# Patient Record
Sex: FEMALE | Race: WHITE | Employment: FULL TIME | URBAN - METROPOLITAN AREA
[De-identification: names, ages, dates, MRNs, and addresses within clinical notes are randomized per-mention and may not be internally consistent; named-entity substitution may affect disease eponyms.]

---

## 2023-07-14 LAB
CHLAMYDIA TRACH., AMPLIFIED: NEGATIVE
CLUE CELLS: PRESENT
N. GONORRHEA, AMPLIFIED: NEGATIVE
NUGENT SCORE: 7
TRICHOMONAS VAGINALIS: NEGATIVE
YEAST: ABNORMAL

## 2024-01-02 ENCOUNTER — TELEPHONE (OUTPATIENT)
Dept: PRIMARY CARE | Facility: CLINIC | Age: 29
End: 2024-01-02

## 2024-01-04 DIAGNOSIS — F32.A DEPRESSION, UNSPECIFIED DEPRESSION TYPE: Primary | ICD-10-CM

## 2024-01-04 RX ORDER — BUPROPION HYDROCHLORIDE 200 MG/1
200 TABLET, EXTENDED RELEASE ORAL 2 TIMES DAILY
COMMUNITY
Start: 2023-06-06 | End: 2024-01-04 | Stop reason: SDUPTHER

## 2024-01-04 RX ORDER — BUPROPION HYDROCHLORIDE 150 MG/1
150 TABLET, EXTENDED RELEASE ORAL EVERY 12 HOURS
COMMUNITY

## 2024-01-04 RX ORDER — FLUCONAZOLE 150 MG/1
TABLET ORAL
COMMUNITY

## 2024-01-04 RX ORDER — METRONIDAZOLE 500 MG/1
TABLET ORAL
COMMUNITY

## 2024-01-05 RX ORDER — BUPROPION HYDROCHLORIDE 200 MG/1
200 TABLET, EXTENDED RELEASE ORAL 2 TIMES DAILY
Qty: 180 TABLET | Refills: 0 | Status: SHIPPED | OUTPATIENT
Start: 2024-01-05

## 2024-01-05 RX ORDER — BUPROPION HYDROCHLORIDE 150 MG/1
150 TABLET, EXTENDED RELEASE ORAL EVERY 12 HOURS
OUTPATIENT
Start: 2024-01-05

## 2024-06-07 ENCOUNTER — APPOINTMENT (OUTPATIENT)
Dept: OBSTETRICS AND GYNECOLOGY | Facility: CLINIC | Age: 29
End: 2024-06-07
Payer: MEDICARE

## 2024-06-13 ENCOUNTER — LAB (OUTPATIENT)
Dept: LAB | Facility: LAB | Age: 29
End: 2024-06-13
Payer: MEDICARE

## 2024-06-13 ENCOUNTER — APPOINTMENT (OUTPATIENT)
Dept: OBSTETRICS AND GYNECOLOGY | Facility: CLINIC | Age: 29
End: 2024-06-13
Payer: MEDICARE

## 2024-06-13 DIAGNOSIS — N89.8 VAGINAL DISCHARGE: ICD-10-CM

## 2024-06-13 DIAGNOSIS — O09.91 SUPERVISION OF HIGH RISK PREGNANCY IN FIRST TRIMESTER (HHS-HCC): Primary | ICD-10-CM

## 2024-06-13 DIAGNOSIS — I37.0 PULMONARY VALVE STENOSIS, UNSPECIFIED ETIOLOGY: ICD-10-CM

## 2024-06-13 DIAGNOSIS — Z87.59 HISTORY OF IUFD: ICD-10-CM

## 2024-06-13 DIAGNOSIS — F32.A DEPRESSION AFFECTING PREGNANCY IN FIRST TRIMESTER, ANTEPARTUM (HHS-HCC): ICD-10-CM

## 2024-06-13 DIAGNOSIS — O09.91 SUPERVISION OF HIGH RISK PREGNANCY IN FIRST TRIMESTER (HHS-HCC): ICD-10-CM

## 2024-06-13 DIAGNOSIS — F32.A DEPRESSION, UNSPECIFIED DEPRESSION TYPE: ICD-10-CM

## 2024-06-13 DIAGNOSIS — Z67.91 RH NEGATIVE STATE IN ANTEPARTUM PERIOD (HHS-HCC): ICD-10-CM

## 2024-06-13 DIAGNOSIS — O26.899 RH NEGATIVE STATE IN ANTEPARTUM PERIOD (HHS-HCC): ICD-10-CM

## 2024-06-13 DIAGNOSIS — O99.341 DEPRESSION AFFECTING PREGNANCY IN FIRST TRIMESTER, ANTEPARTUM (HHS-HCC): ICD-10-CM

## 2024-06-13 DIAGNOSIS — G43.909 MIGRAINE WITHOUT STATUS MIGRAINOSUS, NOT INTRACTABLE, UNSPECIFIED MIGRAINE TYPE: ICD-10-CM

## 2024-06-13 DIAGNOSIS — Z12.4 SCREENING FOR CERVICAL CANCER: ICD-10-CM

## 2024-06-13 LAB
ABO GROUP (TYPE) IN BLOOD: NORMAL
ANTIBODY SCREEN: NORMAL
ERYTHROCYTE [DISTWIDTH] IN BLOOD BY AUTOMATED COUNT: 12.1 % (ref 11.5–14.5)
HBV SURFACE AG SERPL QL IA: NONREACTIVE
HCT VFR BLD AUTO: 39.9 % (ref 36–46)
HCV AB SER QL: NONREACTIVE
HGB BLD-MCNC: 13.6 G/DL (ref 12–16)
HIV 1+2 AB+HIV1 P24 AG SERPL QL IA: NONREACTIVE
MCH RBC QN AUTO: 31.1 PG (ref 26–34)
MCHC RBC AUTO-ENTMCNC: 34.1 G/DL (ref 32–36)
MCV RBC AUTO: 91 FL (ref 80–100)
NRBC BLD-RTO: 0 /100 WBCS (ref 0–0)
PLATELET # BLD AUTO: 238 X10*3/UL (ref 150–450)
RBC # BLD AUTO: 4.38 X10*6/UL (ref 4–5.2)
REFLEX ADDED, ANEMIA PANEL: NORMAL
RH FACTOR (ANTIGEN D): NORMAL
RUBV IGG SERPL IA-ACNC: 1.4 IA
RUBV IGG SERPL QL IA: POSITIVE
TREPONEMA PALLIDUM IGG+IGM AB [PRESENCE] IN SERUM OR PLASMA BY IMMUNOASSAY: NONREACTIVE
WBC # BLD AUTO: 9.5 X10*3/UL (ref 4.4–11.3)

## 2024-06-13 PROCEDURE — 87086 URINE CULTURE/COLONY COUNT: CPT

## 2024-06-13 PROCEDURE — 36415 COLL VENOUS BLD VENIPUNCTURE: CPT

## 2024-06-13 PROCEDURE — 0500F INITIAL PRENATAL CARE VISIT: CPT | Performed by: OBSTETRICS & GYNECOLOGY

## 2024-06-13 PROCEDURE — 87205 SMEAR GRAM STAIN: CPT

## 2024-06-13 PROCEDURE — 87389 HIV-1 AG W/HIV-1&-2 AB AG IA: CPT

## 2024-06-13 PROCEDURE — 88175 CYTOPATH C/V AUTO FLUID REDO: CPT

## 2024-06-13 PROCEDURE — 86803 HEPATITIS C AB TEST: CPT

## 2024-06-13 PROCEDURE — 86900 BLOOD TYPING SEROLOGIC ABO: CPT

## 2024-06-13 PROCEDURE — 87661 TRICHOMONAS VAGINALIS AMPLIF: CPT

## 2024-06-13 PROCEDURE — 86850 RBC ANTIBODY SCREEN: CPT

## 2024-06-13 PROCEDURE — 86901 BLOOD TYPING SEROLOGIC RH(D): CPT

## 2024-06-13 PROCEDURE — 86780 TREPONEMA PALLIDUM: CPT

## 2024-06-13 PROCEDURE — 87624 HPV HI-RISK TYP POOLED RSLT: CPT

## 2024-06-13 PROCEDURE — 87340 HEPATITIS B SURFACE AG IA: CPT

## 2024-06-13 PROCEDURE — 83021 HEMOGLOBIN CHROMOTOGRAPHY: CPT

## 2024-06-13 PROCEDURE — 87491 CHLMYD TRACH DNA AMP PROBE: CPT

## 2024-06-13 PROCEDURE — 87591 N.GONORRHOEAE DNA AMP PROB: CPT

## 2024-06-13 PROCEDURE — 86317 IMMUNOASSAY INFECTIOUS AGENT: CPT

## 2024-06-13 PROCEDURE — 85027 COMPLETE CBC AUTOMATED: CPT

## 2024-06-13 RX ORDER — BUPROPION HYDROCHLORIDE 200 MG/1
200 TABLET, EXTENDED RELEASE ORAL 2 TIMES DAILY
Qty: 180 TABLET | Refills: 3 | Status: SHIPPED | OUTPATIENT
Start: 2024-06-13
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
## 2024-06-14 PROBLEM — G43.909 MIGRAINE WITHOUT STATUS MIGRAINOSUS, NOT INTRACTABLE: Status: ACTIVE | Noted: 2024-06-14

## 2024-06-14 PROBLEM — O26.899 RH NEGATIVE STATE IN ANTEPARTUM PERIOD (HHS-HCC): Status: ACTIVE | Noted: 2024-06-14

## 2024-06-14 PROBLEM — Z87.59 HISTORY OF IUFD: Status: ACTIVE | Noted: 2024-06-14

## 2024-06-14 PROBLEM — F32.A DEPRESSION AFFECTING PREGNANCY IN FIRST TRIMESTER, ANTEPARTUM (HHS-HCC): Status: ACTIVE | Noted: 2024-06-14

## 2024-06-14 PROBLEM — I37.0 PULMONARY VALVE STENOSIS: Status: ACTIVE | Noted: 2024-06-14

## 2024-06-14 PROBLEM — Z67.91 RH NEGATIVE STATE IN ANTEPARTUM PERIOD (HHS-HCC): Status: ACTIVE | Noted: 2024-06-14

## 2024-06-14 PROBLEM — O99.341 DEPRESSION AFFECTING PREGNANCY IN FIRST TRIMESTER, ANTEPARTUM (HHS-HCC): Status: ACTIVE | Noted: 2024-06-14

## 2024-06-14 PROBLEM — O09.91 SUPERVISION OF HIGH RISK PREGNANCY IN FIRST TRIMESTER (HHS-HCC): Status: ACTIVE | Noted: 2024-06-14

## 2024-06-14 LAB
BACTERIA UR CULT: NO GROWTH
BACTERIAL VAGINOSIS VAG-IMP: NORMAL
CLUE CELLS VAG LPF-#/AREA: NORMAL /[LPF]
NUGENT SCORE: 6
YEAST VAG WET PREP-#/AREA: NORMAL

## 2024-06-15 DIAGNOSIS — B96.89 BV (BACTERIAL VAGINOSIS): Primary | ICD-10-CM

## 2024-06-15 DIAGNOSIS — N76.0 BV (BACTERIAL VAGINOSIS): Primary | ICD-10-CM

## 2024-06-15 LAB
C TRACH RRNA SPEC QL NAA+PROBE: NEGATIVE
N GONORRHOEA DNA SPEC QL PROBE+SIG AMP: NEGATIVE
T VAGINALIS RRNA SPEC QL NAA+PROBE: NEGATIVE

## 2024-06-15 RX ORDER — METRONIDAZOLE 7.5 MG/G
GEL VAGINAL DAILY
Qty: 70 G | Refills: 0 | Status: SHIPPED | OUTPATIENT
Start: 2024-06-15 | End: 2024-06-20

## 2024-06-17 ENCOUNTER — HOSPITAL ENCOUNTER (OUTPATIENT)
Dept: RADIOLOGY | Facility: CLINIC | Age: 29
Discharge: HOME | End: 2024-06-17
Payer: MEDICARE

## 2024-06-17 DIAGNOSIS — O09.91 SUPERVISION OF HIGH RISK PREGNANCY IN FIRST TRIMESTER (HHS-HCC): ICD-10-CM

## 2024-06-17 LAB
HEMOGLOBIN A2: 2.9 % (ref 2–3.5)
HEMOGLOBIN A: 96.7 % (ref 95.8–98)
HEMOGLOBIN F: 0.4 % (ref 0–2)
HEMOGLOBIN IDENTIFICATION INTERPRETATION: NORMAL
PATH REVIEW-HGB IDENTIFICATION: NORMAL

## 2024-06-17 PROCEDURE — 76801 OB US < 14 WKS SINGLE FETUS: CPT | Performed by: MEDICAL GENETICS

## 2024-06-17 PROCEDURE — 76801 OB US < 14 WKS SINGLE FETUS: CPT

## 2024-06-28 ENCOUNTER — TELEPHONE (OUTPATIENT)
Dept: OBSTETRICS AND GYNECOLOGY | Facility: CLINIC | Age: 29
End: 2024-06-28
Payer: MEDICARE

## 2024-06-28 LAB
CYTOLOGY CMNT CVX/VAG CYTO-IMP: NORMAL
HPV HR GENOTYPES PNL CVX NAA+PROBE: POSITIVE
LAB AP HPV GENOTYPE QUESTION: NO
LAB AP HPV HR: NORMAL
LAB AP PAP ADDITIONAL TESTS: NORMAL
LABORATORY COMMENT REPORT: NORMAL
LMP START DATE: NORMAL
PATH REPORT.TOTAL CANCER: NORMAL

## 2024-06-28 NOTE — TELEPHONE ENCOUNTER
Spoke with patient about results. Patient was sent over information about HPV and answer all questions. Patient was forward to  to schedule prenatal visit and colpo would be completed at her visit.

## 2024-07-01 ENCOUNTER — INITIAL PRENATAL (OUTPATIENT)
Dept: MATERNAL FETAL MEDICINE | Facility: HOSPITAL | Age: 29
End: 2024-07-01
Payer: MEDICARE

## 2024-07-01 VITALS — SYSTOLIC BLOOD PRESSURE: 123 MMHG | WEIGHT: 182 LBS | DIASTOLIC BLOOD PRESSURE: 75 MMHG | BODY MASS INDEX: 30.29 KG/M2

## 2024-07-01 DIAGNOSIS — Z87.59 HISTORY OF IUFD: ICD-10-CM

## 2024-07-01 DIAGNOSIS — Z3A.12 12 WEEKS GESTATION OF PREGNANCY (HHS-HCC): Primary | ICD-10-CM

## 2024-07-01 DIAGNOSIS — I37.0 PULMONARY VALVE STENOSIS, UNSPECIFIED ETIOLOGY: ICD-10-CM

## 2024-07-01 PROCEDURE — 99205 OFFICE O/P NEW HI 60 MIN: CPT | Performed by: STUDENT IN AN ORGANIZED HEALTH CARE EDUCATION/TRAINING PROGRAM

## 2024-07-01 PROCEDURE — 99215 OFFICE O/P EST HI 40 MIN: CPT | Performed by: STUDENT IN AN ORGANIZED HEALTH CARE EDUCATION/TRAINING PROGRAM

## 2024-07-01 NOTE — PROGRESS NOTES
Zoraida Martinez is a 27 yo  @ 12w2d with a history of prior IUFD and pulmonary stenosis who presents for a [facility] consultation.     In Zoraida's last pregnancy she was diagnosed with an IUFD at 23 weeks gestation. She initially presented to clinic where they were unable to obtain fetal heart tones. She was sent to the ED where a limited ultrasound diagnosed an intrauterine fetal demise measuring approximately 17 weeks 1 day.    The following day she presented to L&D for an induction of labor. She had a negative KB and normal TSH (2.41) on admission. She proceeded to a vaginal delivery with  ml. Placental pathology showed an immature placenta measuring less than the 10%, peripheral insertion of the umbilical cord, recent retroplacental hematoma with intraplacental extension and surrounding villous infarction and involutional changes of an intrauterine demise. Genetics revealed a normal microarray. An autopsy was not performed.    Following delivery she was transferred to Psychiatry due to severe depression. She was hospitalized for six days and ultimately discharged on lamotrogine, sertraline, quetiapine and clonazepam. She currently only takes Wellbutrin.    Zoraida also reports a history of pulmonary stenosis. She states this was diagnosed when she born. She denies chest pain or shortness of breath. She has not followed with Cardiology.    Zoraida is otherwise doing well. She reports some cramping but denies vaginal bleeding. No other concerns.    PMHX: depression, anxiety, pulmonary stenosis, ADHD  PSHX: denies  OBHX:    1st: EAB, D&C   2nd: IUFD @ 23 weeks   3rd: current  GYN: denies STI  MEDS: PNV, Wellbutrin  ALL: NKDA  SOCIAL: denies tobacco/alcohol/illicit drug use  FAMILY: mother - HTN, prediabetic    O: /75   Wt 82.6 kg (182 lb)   LMP 2024   BMI 30.29 kg/m²   Gen: NAD  Resp: nonlabored breathing  Cardiac: good peripheral perfusion  Abd: gravid  Psych: appropriate mood and  affect  FHTs: 163    A/P: Zoraida Martinez is a 29 yo  @ 12w2d with a history of prior IUFD and pulmonary stenosis who presents for a [facility] consultation.     History of a prior stillbirth:    Women with a prior history of stillbirth or pregnancy complications, such as fetal growth restriction, or preeclampsia, are at an increased risk of stillbirth in subsequent pregnancies.    The most important tests in the evaluation of a stillbirth are fetal autopsy; examination of the placenta, cord, and membranes; and karyotype/microarray evaluation. Zoraida had a normal microarray and the placental pathology was unremarkable. An autopsy was not performed. Maternal evaluation should include a lupus anticoagulant, anticardiolipin IgG and IgM, anti-?2-glycoprotein I IgG and IgM. I would recommend that these are ordered at her next visit.    Unfortunately, significant proportion of stillbirths remain unexplained even after a thorough evaluation. Due to Zoraida's history of an IUFD at 23 weeks that was measuring approximately 17 weeks I would recommend the following:    -Early anatomy at 16-17 weeks gestation  -Serial growth ultrasound at 24 weeks gestation  - testing at 32 weeks (can consider as early as 24 weeks)  -Delivery can be considered at 37 weeks for maternal anxiety (the risk of  admission was discussed with patient)    Pulmonary stenosis:    Zoraida reports a history of a pulmonary stenosis that was diagnosed as an infant. She was previously followed by Cardiology but states she hasn't been seen since childhood. Zoraida is currently NYHA class I with a mWHO risk class I (2-5% maternal cardiac event rate). We reviewed that pregnancy is a dynamic cardiac state which can unmask or worsen pre-existing cardiac dysfunction and that women with cardiac disease face a number of challenges due to the changes in maternal physiology. To accommodate the developing fetus, cardiac output increases by up to 45% through  an increase in maternal heart rate (20%) and plasma volume (up to 50%). In a patient with cardiomyopathy or a structurally abnormal heart, these changes can predispose to arrhythmias and heart failure. Fortunately, pregnancy is typically well-tolerated by patients with pulmonary stenosis.     An echocardiogram was previously ordered. I have also taken the liberty of ordering a BNP. If the echocardiogram is abnormal I would recommend a referral to Cardiology. I am hopeful that with close monitoring she will have a favorable outcome with a spontaneous vaginal delivery. Telemetry and SBE prophylaxis is not indicated.  section should be reserved for routine obstetric indications. We discussed that children of mothers with congenital cardiac anomalies are at increased risk of having a structural defect themselves. We recommend all women with this history have a fetal echo.     Thank you for the consultation. Please reach out to me if you have any questions or concerns.    Indra Banegas MD  Maternal-Fetal Medicine

## 2024-07-02 ENCOUNTER — HOSPITAL ENCOUNTER (OUTPATIENT)
Dept: RADIOLOGY | Facility: CLINIC | Age: 29
Discharge: HOME | End: 2024-07-02
Payer: MEDICARE

## 2024-07-02 ENCOUNTER — APPOINTMENT (OUTPATIENT)
Dept: LAB | Facility: LAB | Age: 29
End: 2024-07-02
Payer: MEDICARE

## 2024-07-02 DIAGNOSIS — O09.91 SUPERVISION OF HIGH RISK PREGNANCY IN FIRST TRIMESTER (HHS-HCC): ICD-10-CM

## 2024-07-02 DIAGNOSIS — Z34.91 ENCOUNTER FOR SUPERVISION OF NORMAL PREGNANCY, UNSPECIFIED, FIRST TRIMESTER (HHS-HCC): Primary | ICD-10-CM

## 2024-07-02 DIAGNOSIS — O26.891 OTHER SPECIFIED PREGNANCY RELATED CONDITIONS, FIRST TRIMESTER (HHS-HCC): ICD-10-CM

## 2024-07-02 DIAGNOSIS — O99.211 OBESITY COMPLICATING PREGNANCY, FIRST TRIMESTER (HHS-HCC): ICD-10-CM

## 2024-07-02 PROCEDURE — 76816 OB US FOLLOW-UP PER FETUS: CPT | Performed by: STUDENT IN AN ORGANIZED HEALTH CARE EDUCATION/TRAINING PROGRAM

## 2024-07-02 PROCEDURE — 83520 IMMUNOASSAY QUANT NOS NONAB: CPT

## 2024-07-02 PROCEDURE — 84702 CHORIONIC GONADOTROPIN TEST: CPT

## 2024-07-02 PROCEDURE — 76816 OB US FOLLOW-UP PER FETUS: CPT

## 2024-07-02 PROCEDURE — 36415 COLL VENOUS BLD VENIPUNCTURE: CPT

## 2024-07-02 PROCEDURE — 86336 INHIBIN A: CPT

## 2024-07-02 PROCEDURE — 76813 OB US NUCHAL MEAS 1 GEST: CPT | Performed by: STUDENT IN AN ORGANIZED HEALTH CARE EDUCATION/TRAINING PROGRAM

## 2024-07-02 PROCEDURE — 76813 OB US NUCHAL MEAS 1 GEST: CPT

## 2024-07-08 LAB
# FETUSES US: 1
1ST TRIMESTER SCN+NT PATIENT-IMP: NORMAL
AGE AT DELIVERY: 27.5 YR
FET CRL US.MEAS: 66.7 MM
FET NUCHAL FOLD MOM THICKNESS US.MEAS: 0.93
FET NUCHAL FOLD THICKNESS US.MEAS: 1.4 MM
FET TS 18 RISK FROM MAT AGE: NORMAL
FET TS 21 RISK FROM MAT AGE: NORMAL
GA: 12.7 WEEKS
HCG ADJ MOM SERPL: 1
HCG SERPL-ACNC: 74.5 IU/ML
INHIBIN A ADJ MOM SERPL: 1.31
INHIBIN A SERPL-MCNC: 261.2 PG/ML
LABORATORY COMMENT REPORT: NORMAL
NOTE,FIRST TRIMESTER SCREEN W/NT: NORMAL
PAPP-A ADJ MOM SERPL: 0.83
PAPP-A SERPL-MCNC: 694.7 NG/ML
RESULTS,FIRST TRIMESTER SCREEN W/NT: NORMAL
SONOGRAPHER: NORMAL
TS 18 RISK FETUS: NORMAL
TS 21 RISK FETUS: NORMAL
US DATE: NORMAL

## 2024-07-15 ENCOUNTER — APPOINTMENT (OUTPATIENT)
Dept: OBSTETRICS AND GYNECOLOGY | Facility: CLINIC | Age: 29
End: 2024-07-15
Payer: MEDICARE

## 2024-07-15 VITALS — WEIGHT: 184 LBS | SYSTOLIC BLOOD PRESSURE: 116 MMHG | BODY MASS INDEX: 30.62 KG/M2 | DIASTOLIC BLOOD PRESSURE: 68 MMHG

## 2024-07-15 DIAGNOSIS — I37.0 PULMONARY VALVE STENOSIS, UNSPECIFIED ETIOLOGY: ICD-10-CM

## 2024-07-15 DIAGNOSIS — Z87.59 HISTORY OF IUFD: ICD-10-CM

## 2024-07-15 DIAGNOSIS — O26.899 RH NEGATIVE STATE IN ANTEPARTUM PERIOD (HHS-HCC): ICD-10-CM

## 2024-07-15 DIAGNOSIS — R87.810 ASCUS WITH POSITIVE HIGH RISK HPV CERVICAL: ICD-10-CM

## 2024-07-15 DIAGNOSIS — Z67.91 RH NEGATIVE STATE IN ANTEPARTUM PERIOD (HHS-HCC): ICD-10-CM

## 2024-07-15 DIAGNOSIS — O09.91 SUPERVISION OF HIGH RISK PREGNANCY IN FIRST TRIMESTER (HHS-HCC): Primary | ICD-10-CM

## 2024-07-15 DIAGNOSIS — O99.341 DEPRESSION AFFECTING PREGNANCY IN FIRST TRIMESTER, ANTEPARTUM (HHS-HCC): ICD-10-CM

## 2024-07-15 DIAGNOSIS — R87.610 ASCUS WITH POSITIVE HIGH RISK HPV CERVICAL: ICD-10-CM

## 2024-07-15 DIAGNOSIS — F32.A DEPRESSION AFFECTING PREGNANCY IN FIRST TRIMESTER, ANTEPARTUM (HHS-HCC): ICD-10-CM

## 2024-07-15 PROCEDURE — 0501F PRENATAL FLOW SHEET: CPT | Performed by: OBSTETRICS & GYNECOLOGY

## 2024-07-16 NOTE — PROGRESS NOTES
OB Follow up visit    ASSESSMENT & PLAN    Zoraida Martinez is a 28 y.o.  at 17w5d presenting for routine prenatal care    Problem List Items Addressed This Visit       Supervision of high risk pregnancy in first trimester ([facility]) - Primary    Overview     [x] Dating: by 10 week ultrasound (LMP unknown)  [] Initial BMI: need height to calculate  [x] Prenatal Labs: reviewed, Rh negative, Rubella immune, Hgb 13.6  [x] Pap: 2024 ASCUS/+HR HPV, colpo performed and no high grade abnormalities suspected  [x] Aneuploidy Screening: normal first trimester screen  [x] Baby ASA: not indicated, one moderate risk factor  [] Anatomy US: scheduled  [] 1hr GCT at 24-28wks:  [] Tdap (27-36wks):  [] Flu Shot:  [] COVID vaccine:   [] Rhogam (if Rh neg):  [] Third trimester growth:   [] GBS at 36 wks:  [] Breastfeeding  [] PPBC:   [] 39 weeks discussion of IOL vs. Expectant management:  [] Mode of delivery:            Depression affecting pregnancy in first trimester, antepartum ([facility])    Overview     - previously diagnosed with anxiety, depression, ADHD, PTSD, as well as bipolar affective disorder but patient questions this diagnosis  - Cut down wellbyutin dose from 200mg to 100mg BID but is feeling more symptomatic and desires to return to 200mg BID  - recommend establishing care with psychiatry and she is agreeable. Scheduled for first visit with psychiatry in August         History of IUFD    Overview     - 23 weeks fetal demise, delivered by   - No clear etiology based on chart review, normal microarray and placental pathology, no autopsy performed  - ordered lupus anticoagulant, anticardiolipin antibodies and anti-B2 glycoprotein  - plan for serial growth ultrasound after 24 weeks   - plan for  testing at 32 weeks (can consider as early as 24 weeks)  - plan for delivery no later than 39 weeks. Can consider at 37 weeks for maternal anxiety          Relevant Orders    Lupus Anticoagulant With Interpretation  [TUCKER]    Anti-Cardiolipin Antibody (IgA, IgG, and IgM)    Beta-2 Glycoprotein Antibodies    Rh negative state in antepartum period ([facility])    Pulmonary valve stenosis    Overview     S/p MFM Consult  Echo scheduled, if abnormal plan for cardiology consult  BNP order in place  Plan for fetal echo  No need for telemetry in labor or SBE prophylaxis               Orders Placed This Encounter   Procedures    Lupus Anticoagulant With Interpretation [TUCKER]     Standing Status:   Future     Standing Expiration Date:   7/15/2025     Order Specific Question:   Release result to [place]     Answer:   Immediate [1]    Anti-Cardiolipin Antibody (IgA, IgG, and IgM)     Standing Status:   Future     Standing Expiration Date:   7/15/2025     Order Specific Question:   Release result to MyChart     Answer:   Immediate [1]    Beta-2 Glycoprotein Antibodies     Standing Status:   Future     Standing Expiration Date:   7/15/2025     Order Specific Question:   Release result to MyChart     Answer:   Immediate [1]      RTC in 4 weeks      SUBJECTIVE    HPI: Zoraida Martinez is a 28 y.o.  at 17w5d here for RPNV. Patient reports mood has been down. Has her appointment with psychiatry coming up in August. No acute symptoms. Has support from her cousins. Father of the baby is not supportive of the pregnancy at this time.       OBJECTIVE    Visit Vitals  /68   Wt 83.5 kg (184 lb)   LMP  (LMP Unknown)   BMI 30.62 kg/m²   OB Status Pregnant   Smoking Status Never   BSA 1.96 m²        Ashlee Cyr MD

## 2024-07-16 NOTE — PROGRESS NOTES
Patient ID: Zoraida Martinez is a 28 y.o. female.    Colposcopy    Date/Time: 7/16/2024 8:58 AM    Performed by: Ashlee Cyr MD  Authorized by: Ashlee Cyr MD    Consent:     Patient questions answered: yes      Risks and benefits of the procedure and its alternatives discussed: yes      Consent obtained:  Written    Consent given by:  Patient  Pre-procedure:     Prep solution(s): acetic acid    Procedure:     Colposcopy with: colposcopy only      Colposcopy details:  Nabothian cyst at 9 o clock  No lesions noted    Cervix visibility: fully visualized      SCJ visibility: fully visualized    Post-procedure:     Patient tolerance of procedure:  Patient tolerated the procedure well with no immediate complications

## 2024-07-22 ENCOUNTER — APPOINTMENT (OUTPATIENT)
Dept: CARDIOLOGY | Facility: CLINIC | Age: 29
End: 2024-07-22
Payer: MEDICARE

## 2024-08-12 ENCOUNTER — APPOINTMENT (OUTPATIENT)
Dept: OBSTETRICS AND GYNECOLOGY | Facility: CLINIC | Age: 29
End: 2024-08-12
Payer: MEDICARE

## 2024-08-12 VITALS — SYSTOLIC BLOOD PRESSURE: 104 MMHG | WEIGHT: 190 LBS | BODY MASS INDEX: 31.62 KG/M2 | DIASTOLIC BLOOD PRESSURE: 60 MMHG

## 2024-08-12 DIAGNOSIS — O26.899 RH NEGATIVE STATE IN ANTEPARTUM PERIOD (HHS-HCC): ICD-10-CM

## 2024-08-12 DIAGNOSIS — O99.341 DEPRESSION AFFECTING PREGNANCY IN FIRST TRIMESTER, ANTEPARTUM (HHS-HCC): ICD-10-CM

## 2024-08-12 DIAGNOSIS — Z67.91 RH NEGATIVE STATE IN ANTEPARTUM PERIOD (HHS-HCC): ICD-10-CM

## 2024-08-12 DIAGNOSIS — G43.809 OTHER MIGRAINE WITHOUT STATUS MIGRAINOSUS, NOT INTRACTABLE: ICD-10-CM

## 2024-08-12 DIAGNOSIS — F32.A DEPRESSION AFFECTING PREGNANCY IN FIRST TRIMESTER, ANTEPARTUM (HHS-HCC): ICD-10-CM

## 2024-08-12 DIAGNOSIS — Z86.79 HISTORY OF PULMONARY ARTERY STENOSIS: ICD-10-CM

## 2024-08-12 DIAGNOSIS — Z87.59 HISTORY OF IUFD: ICD-10-CM

## 2024-08-12 DIAGNOSIS — I37.0 PULMONARY VALVE STENOSIS, UNSPECIFIED ETIOLOGY: ICD-10-CM

## 2024-08-12 DIAGNOSIS — O09.91 SUPERVISION OF HIGH RISK PREGNANCY IN FIRST TRIMESTER (HHS-HCC): Primary | ICD-10-CM

## 2024-08-12 PROCEDURE — 0501F PRENATAL FLOW SHEET: CPT | Performed by: OBSTETRICS & GYNECOLOGY

## 2024-08-12 NOTE — PROGRESS NOTES
OB Follow up visit    ASSESSMENT & PLAN    Zoraida Martinez is a 29 y.o.  at 18w2d presenting for routine prenatal care    Problem List Items Addressed This Visit       Supervision of high risk pregnancy in first trimester ([facility]) - Primary    Overview     [x] Dating: by 10 week ultrasound (LMP unknown)  [] Initial BMI: need height to calculate  [x] Prenatal Labs: reviewed, Rh negative, Rubella immune, Hgb 13.6  [x] Pap: 2024 ASCUS/+HR HPV, colpo performed and no high grade abnormalities suspected  [x] Aneuploidy Screening: normal first trimester screen  [x] Baby ASA: not indicated, one moderate risk factor  [] Anatomy US: scheduled  [] 1hr GCT at 24-28wks:  [] Tdap (27-36wks):  [] Flu Shot:  [] COVID vaccine:   [] Rhogam (if Rh neg):  [] Third trimester growth:   [] GBS at 36 wks:  [] Breastfeeding  [] PPBC:   [] 39 weeks discussion of IOL vs. Expectant management:  [] Mode of delivery:            Depression affecting pregnancy in first trimester, antepartum ([facility])    Overview     - previously diagnosed with anxiety, depression, ADHD, PTSD, as well as bipolar affective disorder but patient questions this diagnosis  - on wellbutin 200mg BID   - scheduled with Dr. Jenkins in a few days         History of IUFD    Overview     - 23 weeks fetal demise, delivered by   - No clear etiology based on chart review, normal microarray and placental pathology, no autopsy performed  - ordered lupus anticoagulant, anticardiolipin antibodies and anti-B2 glycoprotein  - plan for serial growth ultrasound after 24 weeks   - plan for  testing at 32 weeks (can consider as early as 24 weeks)  - plan for delivery no later than 39 weeks. Can consider at 37 weeks for maternal anxiety          Rh negative state in antepartum period ([facility])    Pulmonary valve stenosis    Overview     S/p MFM Consult  insurance denied coverage of echo so it was cancelled. Will re-order and do peer to peer. Patient unable to get any  records from childhood in [place]. Reports that she was followed for this through high school.  BNP order in place  Plan for fetal echo  No need for telemetry in labor or SBE prophylaxis           Migraine without status migrainosus, not intractable    Overview     Reviewed safe medications in pregnancy          Other Visit Diagnoses       History of pulmonary artery stenosis        Relevant Orders    Transthoracic Echo (TTE) Complete             Orders Placed This Encounter   Procedures    Transthoracic Echo (TTE) Complete     Standing Status:   Future     Standing Expiration Date:   2025     Order Specific Question:   Reason for exam:     Answer:   history of pulmonary stenosis     Order Specific Question:   Possible 3D echo?     Answer:   No     Order Specific Question:   Possible strain echo?     Answer:   No     Order Specific Question:   Where is your preferred location to perform this study?     Answer:   First Available        RTC in 4 weeks      SUBJECTIVE    HPI: Zoraida Martinez is a 29 y.o.  at 18w2d here for RPNV. Denies contractions, bleeding, or LOF. No fetal movement. Patient reports no new concerns today       OBJECTIVE    Visit Vitals  /60   Wt 86.2 kg (190 lb)   LMP  (LMP Unknown)   BMI 31.62 kg/m²   OB Status Pregnant   Smoking Status Never   BSA 1.99 m²        Ashlee Cyr MD

## 2024-08-15 ENCOUNTER — APPOINTMENT (OUTPATIENT)
Dept: BEHAVIORAL HEALTH | Facility: CLINIC | Age: 29
End: 2024-08-15
Payer: MEDICARE

## 2024-08-15 DIAGNOSIS — F32.A DEPRESSION AFFECTING PREGNANCY IN FIRST TRIMESTER, ANTEPARTUM (HHS-HCC): ICD-10-CM

## 2024-08-15 DIAGNOSIS — F41.9 ANXIETY: ICD-10-CM

## 2024-08-15 DIAGNOSIS — F32.A DEPRESSION, UNSPECIFIED DEPRESSION TYPE: ICD-10-CM

## 2024-08-15 DIAGNOSIS — F90.0 ATTENTION DEFICIT HYPERACTIVITY DISORDER (ADHD), PREDOMINANTLY INATTENTIVE TYPE: ICD-10-CM

## 2024-08-15 DIAGNOSIS — O99.341 DEPRESSION AFFECTING PREGNANCY IN FIRST TRIMESTER, ANTEPARTUM (HHS-HCC): ICD-10-CM

## 2024-08-15 PROCEDURE — 99204 OFFICE O/P NEW MOD 45 MIN: CPT | Performed by: STUDENT IN AN ORGANIZED HEALTH CARE EDUCATION/TRAINING PROGRAM

## 2024-08-15 RX ORDER — CLONAZEPAM 0.5 MG/1
0.25 TABLET ORAL DAILY PRN
Qty: 15 TABLET | Refills: 2 | Status: SHIPPED | OUTPATIENT
Start: 2024-08-15 | End: 2024-11-13

## 2024-08-15 NOTE — PROGRESS NOTES
Subjective    All Individuals Present: Patient and Provider (Encounter Provider)     ID: Zoraida Martinez is a 29 y.o. female with hx ADHD, depression, anxiety presenting for antepartum consultation.     Interval History/HPI/PFSH:  @19wga, excited about pregnancy. Plans to try breastfeeding.    Currently on bupropion 200 mg BID, usually helps with depression. Has also been used to manage her ADHD, never been on stimulants. Would always be late since grade school.    Partner left her, is angry because they had always been close. He left her because she wouldn't get an . Now hasn't heard from him in a month.    Feels like being depressed or anxious may harm baby.    Dx ADHD at 21 yo after struggling in school for years    Has previously been on sertraline (no benefit), fluoxetine (more anxious).    IUFD happened on Mother's day, was transferred straight to inpatient psych.    Terrified of being too sedated for when baby comes.    Has anatomy scan next week.     Medication side effects: None Reported    Past Psychiatric Hx:  Dx: Depression, anxiety, ADHD  -Hospitalization: once,  immediately after IUFD, there for 6 days, discharged on lamotrigine, quetiapine  -No SA, some SIB by cutting as teenager, last time was right after IUFD  -Therapist: none currently  -Rx trials: sertraline (no benefit), fluoxetine (more anxious), lamotrigine (breakout rash on hands), quetiapine (helped for sleep after hospital), alprazolam (too sedating), was previously given clonazepam at 21 yo for ADHD ostensibly (clarified was clonazepam and not clonidine), clonazepam worked better than alprazolam because less sedating and longer lasting, was on for years, was stopped after inpatient hospitalization, hydroxyzine while in the hospital (very groggy/sedated)  -briefly saw [facility] after hospitalization    Patient Active Problem List   Diagnosis    Supervision of high risk pregnancy in first trimester ([facility])    Depression  affecting pregnancy in first trimester, antepartum ([facility])    History of IUFD    Rh negative state in antepartum period ([facility])    Pulmonary valve stenosis    Migraine without status migrainosus, not intractable      Current Outpatient Medications on File Prior to Visit   Medication Sig Dispense Refill    buPROPion SR (Wellbutrin SR) 200 mg 12 hr tablet Take 1 tablet (200 mg) by mouth 2 times a day. 180 tablet 3    prenatal vit,calc76-iron-folic (Prenatabs Rx) 29 mg iron- 1 mg tablet Take 1 tablet by mouth once daily.       No current facility-administered medications on file prior to visit.        No Known Allergies  seasonal    No past surgical history on file.  none    No family history on file.   Mom- depression, ADHD  Maternal grandmother- schizophrenia, dementia  Maternal great grandfather- schizophrenia      Social History     Socioeconomic History    Marital status: Single     Spouse name: Not on file    Number of children: Not on file    Years of education: Not on file    Highest education level: Not on file   Occupational History    Not on file   Tobacco Use    Smoking status: Never    Smokeless tobacco: Never   Substance and Sexual Activity    Alcohol use: Not on file    Drug use: Not on file    Sexual activity: Not on file   Other Topics Concern    Not on file   Social History Narrative    Not on file     Social Determinants of Health     Financial Resource Strain: Not on file   Food Insecurity: Not on file   Transportation Needs: Not on file   Physical Activity: Not on file   Stress: Not on file   Social Connections: Not on file   Intimate Partner Violence: Not on file      Currently working bartending  Partner just left her when pt wouldn't get an .  Cousin and close friend are two biggest support systems.  Plans to go back to school for sports medicine    Substances:  -no tobacco  -no EtOH (stopped before pregnancy)  -no cananbis, no illicits  -1-2 cans Red Bull per day    OB History     Para Term  AB Living   3 1   1 1     SAB IAB Ectopic Multiple Live Births   0 1            # Outcome Date GA Lbr Tim/2nd Weight Sex Type Anes PTL Lv   3 Current            2      M Vag-Spont   FD   1 IAB                  Review of Systems  Constitutional: Positive for fatigue, Negative for fevers  Psychiatric: Positive for anxiety, depression, fatigue, sleep disturbance, and inattention, Negative for decreased appetite and irritability  Neurological: Positive for headaches   Other: @19wga; pulmonary valve stenosis    Objective   LMP  (LMP Unknown)   Wt Readings from Last 4 Encounters:   24 86.2 kg (190 lb)   07/15/24 83.5 kg (184 lb)   24 82.6 kg (182 lb)   23 83.9 kg (185 lb)       Mental Status Exam  General Appearance: Well groomed, appropriate eye contact.  Attitude/Behavior: Cooperative, conversant, engaged, and with good eye contact.  Motor: No psychomotor agitation or retardation, no tremor or other abnormal movements.  Speech: Normal rate, volume, prosody  Gait/Station: Within normal limits  Mood: depressed, anxious.  Affect: Dysphoric, constricted but reactive, Anxious, and Congruent with mood and topic of conversation  Thought Process: Circumstantial  Thought Associations: No loosening of associations  Thought Content: normal  Sensorium: Alert and oriented to person, place, time and situation  Insight: Intact, as evidenced by awareness of symptom patterns  Judgment: Intact  Cognition: Attention: Mild impairment in attention, Fund of Knowledge: Good, Language: Naming intact, Orientation: x4, Recent memory: intact, and Remote Memory: intact  Testing:  refer to prior ADHD testing.    Laboratory/Imaging/Diagnostic Tests       Assessment/Plan   Overall Formulation and Differential Diagnosis:  Zoraida Martinez is a 29 y.o. female who meets criteria for depression, anxiety, ADHD.  Interval Assessment:  @19wga presenting with depression and anxiety during pregnancy. Hx  ADHD. Limited psychosocial support. Has had fair response from bupropion for depression/ADHD and will continue. Has had good effect from clonazepam before, will cautiously continue PRN while stabilizing mood/anxiety. Discussed risks, benefits, alternatives, and existing data on use of bupropion and clonazepam in pregnancy and lactation and she expressed understanding.    Plan:  -continue bupropion 200 mg BID  -clonazepam 0.25 mg PO daily PRN panic  -RTC 4 weeks    Risk Assessment:  Imminent Risk of Suicide or Serious Self-Injury: Low Risk -- Risk factors include: Depression, History of trauma or abuse , Lack of social supports , Loss (relational, social, occupational, financial) , Medical illness comorbidity , and Severe anxiety Protective factors include:Denies current suicidal ideation, Denies history of suicide attempts , Future-oriented talk , Willingness to seek help and support , Skills in problem solving, conflict resolution, and nonviolent handling of disputes, Cultural and Rastafarian beliefs that discourage suicide and support self-preservation , Access to a variety of clinical interventions , Receiving and engaged in care for mental, physical, and substance use disorders , History of adhering to treatment recommendations and/or prescribed medication regimen , Support through ongoing medical and mental healthcare relationships , Current/history of good response to treatment/meds , and Restricted access to firearms or other lethal means of suicide   Imminent Risk of Violence or Homicide: Low Risk - Risk factors include: No significant risk factors identified on screening. Protective factors include: Lack of known history of harm to others , Lack of known history of violent ideation , and Lack of known access to firearms   Treatment Plan:  There are no recently modified care plans to display for this patient.      Attestation Statements   Number of Minutes Spent Performing Evaluation & Management (E&M): 60

## 2024-08-19 ENCOUNTER — HOSPITAL ENCOUNTER (OUTPATIENT)
Dept: RADIOLOGY | Facility: CLINIC | Age: 29
End: 2024-08-19
Payer: MEDICARE

## 2024-08-27 ENCOUNTER — DOCUMENTATION (OUTPATIENT)
Dept: OBSTETRICS AND GYNECOLOGY | Facility: HOSPITAL | Age: 29
End: 2024-08-27

## 2024-08-27 ENCOUNTER — HOSPITAL ENCOUNTER (OUTPATIENT)
Dept: RADIOLOGY | Facility: CLINIC | Age: 29
Discharge: HOME | End: 2024-08-27
Payer: MEDICARE

## 2024-08-27 ENCOUNTER — LAB (OUTPATIENT)
Dept: LAB | Facility: LAB | Age: 29
End: 2024-08-27
Payer: MEDICARE

## 2024-08-27 DIAGNOSIS — I37.0 PULMONARY VALVE STENOSIS, UNSPECIFIED ETIOLOGY: ICD-10-CM

## 2024-08-27 DIAGNOSIS — F32.A DEPRESSION AFFECTING PREGNANCY IN FIRST TRIMESTER, ANTEPARTUM (HHS-HCC): ICD-10-CM

## 2024-08-27 DIAGNOSIS — O99.341 DEPRESSION AFFECTING PREGNANCY IN FIRST TRIMESTER, ANTEPARTUM (HHS-HCC): ICD-10-CM

## 2024-08-27 DIAGNOSIS — O09.91 SUPERVISION OF HIGH RISK PREGNANCY IN FIRST TRIMESTER (HHS-HCC): ICD-10-CM

## 2024-08-27 DIAGNOSIS — Z87.59 HISTORY OF IUFD: ICD-10-CM

## 2024-08-27 DIAGNOSIS — O26.899 RH NEGATIVE STATE IN ANTEPARTUM PERIOD (HHS-HCC): ICD-10-CM

## 2024-08-27 DIAGNOSIS — Z3A.12 12 WEEKS GESTATION OF PREGNANCY (HHS-HCC): ICD-10-CM

## 2024-08-27 DIAGNOSIS — Z67.91 RH NEGATIVE STATE IN ANTEPARTUM PERIOD (HHS-HCC): ICD-10-CM

## 2024-08-27 DIAGNOSIS — O09.91 SUPERVISION OF HIGH RISK PREGNANCY IN FIRST TRIMESTER (HHS-HCC): Primary | ICD-10-CM

## 2024-08-27 LAB — BNP SERPL-MCNC: 71 PG/ML (ref 0–99)

## 2024-08-27 PROCEDURE — 76811 OB US DETAILED SNGL FETUS: CPT | Performed by: OBSTETRICS & GYNECOLOGY

## 2024-08-27 PROCEDURE — 83880 ASSAY OF NATRIURETIC PEPTIDE: CPT

## 2024-08-27 PROCEDURE — 85613 RUSSELL VIPER VENOM DILUTED: CPT

## 2024-08-27 PROCEDURE — 76811 OB US DETAILED SNGL FETUS: CPT

## 2024-08-27 PROCEDURE — 86147 CARDIOLIPIN ANTIBODY EA IG: CPT

## 2024-08-27 PROCEDURE — 86146 BETA-2 GLYCOPROTEIN ANTIBODY: CPT

## 2024-08-27 PROCEDURE — 85730 THROMBOPLASTIN TIME PARTIAL: CPT

## 2024-08-27 PROCEDURE — 36415 COLL VENOUS BLD VENIPUNCTURE: CPT

## 2024-08-28 LAB
B2 GLYCOPROT1 IGA SER-ACNC: <0.6 U/ML
B2 GLYCOPROT1 IGG SER-ACNC: <1.4 U/ML
B2 GLYCOPROT1 IGM SER-ACNC: 0.6 U/ML
CARDIOLIPIN IGA SERPL-ACNC: <0.5 APL U/ML
CARDIOLIPIN IGG SER IA-ACNC: <1.6 GPL U/ML
CARDIOLIPIN IGM SER IA-ACNC: 0.3 MPL U/ML

## 2024-08-29 LAB
DRVVT SCREEN TO CONFIRM RATIO: 0.73 RATIO
DRVVT/DRVVT CFM NRMLZD PPP-RTO: 0.81 RATIO
DRVVT/DRVVT CFM P DOAC NEUT NORM PPP-RTO: 0.9 RATIO
LA 2 SCREEN W REFLEX-IMP: NORMAL
NORMALIZED SCT PPP-RTO: 0.88 RATIO
SILICA CLOTTING TIME CONFIRMATION: 0.87 RATIO
SILICA CLOTTING TIME SCREEN: 0.76 RATIO

## 2024-08-29 NOTE — PROGRESS NOTES
Late entry from 8/27:  This NPs assistance with obtaining approval for Maternal Echo was requested by Dr. Wright.  This NP called the [facility] ([phone]) and initiated the request for approval for her 8/12 echo order on 8/27.   The referral is now under review, Tracking number #[id number].   She will likely need a Peer 2 Peer.  I can also keep an eye on the status of the order and notify her primary OB if/when a P2P is requested.   If this Echo is not approved, per Dr. Banegas will plan to refer her to cardiology.     Krystyna Cannon CNP  Complex/High Risk OB   [phone]

## 2024-09-04 ENCOUNTER — TELEPHONE (OUTPATIENT)
Dept: OBSTETRICS AND GYNECOLOGY | Facility: HOSPITAL | Age: 29
End: 2024-09-04
Payer: MEDICARE

## 2024-09-04 ENCOUNTER — TELEPHONE (OUTPATIENT)
Dept: OBSTETRICS AND GYNECOLOGY | Facility: CLINIC | Age: 29
End: 2024-09-04
Payer: MEDICARE

## 2024-09-04 ENCOUNTER — TELEPHONE (OUTPATIENT)
Dept: OTHER | Age: 29
End: 2024-09-04
Payer: MEDICARE

## 2024-09-04 DIAGNOSIS — F41.9 ANXIETY: ICD-10-CM

## 2024-09-04 RX ORDER — CLONAZEPAM 0.5 MG/1
0.5 TABLET ORAL DAILY PRN
Qty: 15 TABLET | Refills: 1 | Status: SHIPPED | OUTPATIENT
Start: 2024-09-04 | End: 2024-09-05 | Stop reason: SDUPTHER

## 2024-09-04 NOTE — TELEPHONE ENCOUNTER
This NP called patient to assist with scheduling Echo.   No answer, left voicemail requesting a call back ([phone]).  This NP also, confirmed with Adult Echo manager that an appt must be scheduled to initiate the approval process.   This NP will continue to be available for coordination of care while awaiting call back from patient.      Krystyna Cannon CNP  Complex/High Risk OB   [phone]

## 2024-09-05 DIAGNOSIS — F41.9 ANXIETY: ICD-10-CM

## 2024-09-05 RX ORDER — CLONAZEPAM 0.5 MG/1
TABLET ORAL
Qty: 15 TABLET | Refills: 1 | Status: SHIPPED | OUTPATIENT
Start: 2024-09-05

## 2024-09-06 ENCOUNTER — TELEPHONE (OUTPATIENT)
Dept: OBSTETRICS AND GYNECOLOGY | Facility: HOSPITAL | Age: 29
End: 2024-09-06
Payer: MEDICARE

## 2024-09-12 ENCOUNTER — APPOINTMENT (OUTPATIENT)
Dept: OBSTETRICS AND GYNECOLOGY | Facility: CLINIC | Age: 29
End: 2024-09-12
Payer: MEDICARE

## 2024-09-12 ENCOUNTER — TELEPHONE (OUTPATIENT)
Dept: OBSTETRICS AND GYNECOLOGY | Facility: HOSPITAL | Age: 29
End: 2024-09-12

## 2024-09-12 VITALS — DIASTOLIC BLOOD PRESSURE: 60 MMHG | BODY MASS INDEX: 31.78 KG/M2 | WEIGHT: 191 LBS | SYSTOLIC BLOOD PRESSURE: 102 MMHG

## 2024-09-12 DIAGNOSIS — Z87.59 HISTORY OF IUFD: ICD-10-CM

## 2024-09-12 DIAGNOSIS — Z3A.22 22 WEEKS GESTATION OF PREGNANCY (HHS-HCC): ICD-10-CM

## 2024-09-12 DIAGNOSIS — Z67.91 RH NEGATIVE STATE IN ANTEPARTUM PERIOD (HHS-HCC): ICD-10-CM

## 2024-09-12 DIAGNOSIS — I37.0 PULMONARY VALVE STENOSIS, UNSPECIFIED ETIOLOGY: ICD-10-CM

## 2024-09-12 DIAGNOSIS — O09.92 SUPERVISION OF HIGH RISK PREGNANCY IN SECOND TRIMESTER (HHS-HCC): Primary | ICD-10-CM

## 2024-09-12 DIAGNOSIS — F32.A DEPRESSION AFFECTING PREGNANCY IN FIRST TRIMESTER, ANTEPARTUM (HHS-HCC): ICD-10-CM

## 2024-09-12 DIAGNOSIS — O99.341 DEPRESSION AFFECTING PREGNANCY IN FIRST TRIMESTER, ANTEPARTUM (HHS-HCC): ICD-10-CM

## 2024-09-12 DIAGNOSIS — O26.899 RH NEGATIVE STATE IN ANTEPARTUM PERIOD (HHS-HCC): ICD-10-CM

## 2024-09-12 NOTE — TELEPHONE ENCOUNTER
Called patient to update on ECHO being approved.   No answer, left voicemail requesting call back if issues arise regarding 9/26 appt.   Will continue to be available for coordination of care if needed.     Krystyna Cannon CNP  Complex/High Risk OB   [phone]

## 2024-09-14 NOTE — PROGRESS NOTES
Ob Follow-up  24     SUBJECTIVE    HPI: Zoraida Martinez is a 29 y.o.  at 22w6d here for RPNV.  She denies contractions, bleeding, LOF. Patient reports mood has been up and down. She is following with Dr. Jenkins. She was started on klonipin daily PRN       OBJECTIVE  Visit Vitals  /60   Wt 86.6 kg (191 lb)   LMP  (LMP Unknown)   BMI 31.78 kg/m²   OB Status Pregnant   Smoking Status Never   BSA 1.99 m²            ASSESSMENT & PLAN    Zoraida Martinez is a 29 y.o.  at 22w6d here for the following concerns we addressed today:    Problem List Items Addressed This Visit       Supervision of high risk pregnancy in first trimester ([facility]) - Primary    Overview     [x] Dating: by 10 week ultrasound (LMP unknown)  [] Initial BMI: need height to calculate  [x] Prenatal Labs: reviewed, Rh negative, Rubella immune, Hgb 13.6  [x] Pap: 2024 ASCUS/+HR HPV, colpo performed and no high grade abnormalities suspected  [x] Aneuploidy Screening: normal first trimester screen  [x] Baby ASA: not indicated, one moderate risk factor  [x] Anatomy US:  absent nasal bone, declines genetic counseling/testing  [] 1hr GCT at 24-28wks: ordered for NV  [] Tdap (27-36wks):  [] Flu Shot:  [] COVID vaccine:   [] Rhogam (if Rh neg):  [] Third trimester growth:   [] GBS at 36 wks:  [] Breastfeeding  [] PPBC:   [] 39 weeks discussion of IOL vs. Expectant management:  [] Mode of delivery:            Depression affecting pregnancy in first trimester, antepartum ([facility])    Overview     - previously diagnosed with anxiety, depression, ADHD, PTSD, as well as bipolar affective disorder but patient questions this diagnosis  - on wellbutin 200mg BID   - following with Dr. Jenkins. Started on klonipin daily PRN anxiety         History of IUFD    Overview     - 23 weeks fetal demise, delivered by   - No clear etiology based on chart review, normal microarray and placental pathology, no autopsy performed  - ordered lupus anticoagulant,  anticardiolipin antibodies and anti-B2 glycoprotein  - plan for serial growth ultrasound after 24 weeks   - plan for  testing at 32 weeks (can consider as early as 24 weeks)  - plan for delivery no later than 39 weeks. Can consider at 37 weeks for maternal anxiety          Rh negative state in antepartum period ([facility])    Pulmonary valve stenosis    Overview     S/p MFM Consult  Scheduled for fetal echo  BNP order in place  No need for telemetry in labor or SBE prophylaxis            Other Visit Diagnoses       22 weeks gestation of pregnancy ([facility])                  Orders Placed This Encounter   Procedures    Glucose, 1 Hour Screen, Pregnancy     Standing Status:   Future     Standing Expiration Date:   2025     Order Specific Question:   Release result to MyChart     Answer:   Immediate [1]    CBC Anemia Panel With Reflex,Pregnancy     Standing Status:   Future     Standing Expiration Date:   2025     Order Specific Question:   Release result to MyChart     Answer:   Immediate [1]    Syphilis Screen with Reflex     Standing Status:   Future     Standing Expiration Date:   2025     Order Specific Question:   Release result to MyChart     Answer:   Immediate [1]      RTC in 4 weeks      Ashlee Cyr MD

## 2024-09-16 ENCOUNTER — APPOINTMENT (OUTPATIENT)
Dept: RADIOLOGY | Facility: CLINIC | Age: 29
End: 2024-09-16
Payer: MEDICARE

## 2024-09-16 ENCOUNTER — HOSPITAL ENCOUNTER (OUTPATIENT)
Dept: RADIOLOGY | Facility: CLINIC | Age: 29
Discharge: HOME | End: 2024-09-16
Payer: MEDICARE

## 2024-09-16 DIAGNOSIS — O09.91 SUPERVISION OF HIGH RISK PREGNANCY IN FIRST TRIMESTER (HHS-HCC): ICD-10-CM

## 2024-09-16 DIAGNOSIS — O09.90 SUPERVISION OF HIGH RISK PREGNANCY, ANTEPARTUM (HHS-HCC): ICD-10-CM

## 2024-09-16 PROCEDURE — 76816 OB US FOLLOW-UP PER FETUS: CPT | Performed by: OBSTETRICS & GYNECOLOGY

## 2024-09-16 PROCEDURE — 76816 OB US FOLLOW-UP PER FETUS: CPT

## 2024-09-24 ENCOUNTER — APPOINTMENT (OUTPATIENT)
Dept: PRIMARY CARE | Facility: CLINIC | Age: 29
End: 2024-09-24
Payer: MEDICARE

## 2024-09-24 VITALS — DIASTOLIC BLOOD PRESSURE: 64 MMHG | SYSTOLIC BLOOD PRESSURE: 92 MMHG | BODY MASS INDEX: 31.45 KG/M2 | WEIGHT: 189 LBS

## 2024-09-24 DIAGNOSIS — R09.81 SINUS CONGESTION: Primary | ICD-10-CM

## 2024-09-24 PROCEDURE — 99213 OFFICE O/P EST LOW 20 MIN: CPT | Performed by: FAMILY MEDICINE

## 2024-09-24 PROCEDURE — 1036F TOBACCO NON-USER: CPT | Performed by: FAMILY MEDICINE

## 2024-09-24 RX ORDER — FLUTICASONE PROPIONATE 50 MCG
1 SPRAY, SUSPENSION (ML) NASAL DAILY
Qty: 16 G | Refills: 5 | Status: SHIPPED | OUTPATIENT
Start: 2024-09-24 | End: 2025-09-24

## 2024-09-24 RX ORDER — CETIRIZINE HYDROCHLORIDE 10 MG/1
10 TABLET ORAL DAILY
Qty: 90 TABLET | Refills: 0 | Status: SHIPPED | OUTPATIENT
Start: 2024-09-24 | End: 2024-12-23

## 2024-09-24 ASSESSMENT — COLUMBIA-SUICIDE SEVERITY RATING SCALE - C-SSRS
1. IN THE PAST MONTH, HAVE YOU WISHED YOU WERE DEAD OR WISHED YOU COULD GO TO SLEEP AND NOT WAKE UP?: NO
6. HAVE YOU EVER DONE ANYTHING, STARTED TO DO ANYTHING, OR PREPARED TO DO ANYTHING TO END YOUR LIFE?: NO
2. HAVE YOU ACTUALLY HAD ANY THOUGHTS OF KILLING YOURSELF?: NO

## 2024-09-24 ASSESSMENT — PATIENT HEALTH QUESTIONNAIRE - PHQ9
2. FEELING DOWN, DEPRESSED OR HOPELESS: NOT AT ALL
SUM OF ALL RESPONSES TO PHQ9 QUESTIONS 1 AND 2: 0
1. LITTLE INTEREST OR PLEASURE IN DOING THINGS: NOT AT ALL

## 2024-09-24 ASSESSMENT — ENCOUNTER SYMPTOMS
OCCASIONAL FEELINGS OF UNSTEADINESS: 0
DEPRESSION: 0
LOSS OF SENSATION IN FEET: 0

## 2024-09-24 NOTE — PROGRESS NOTES
Chief Complaint:  EPV and Ear Fullness (Left ear fullness.  Not painful.)  History Of Present Illness:   Zoraida Martinez is a 29 y.o. female     Chief Complaint: Ear fullness    History Of Present Illness:  Zoraida Martinez is a 29 years old 24 weeks 3 days pregnant female, who presents to the office for ear fullness. Patient states she has been having left ear fullness associated with tinnitus, sneezing, and rhinorrhea since June 2024. She also endorses experiencing popping noise in her ear whenever she talks with someone. She denies any fever, chills, sore throat, headache, dizziness, neck pain, ear pain, or dental pain.    Healthcare team:  Previous PCP - RAJAN Rivas  OBGYN - Dr. Cyr  Behavioral Health - Dr. Jenkins  Cardiology - Dr. Guzmán    PMHx:  Migraine HA  Pulmonary valve stenosis  Depression    Medications:  Bupropion 200 mg BID  Klonopin 0.5 mg PRN    Allergies:  NKDA    Sports/Exercise:  Played softball in high school in [place]     Review of Systems (BOLD if positive, delete if not asked):  Constitutional:  - fever  - chills    Ear/Nose/Throat/Mouth:  - sore throat  - ear fullness and tinnitus, left  - ear pain  - hearing changes  - sneezing  - rhinorrhea  - dental pain    Cardiovascular:  - chest pain  - chest heaviness  - palpitations  - legs swelling    Musculoskeletal:  - bone pain  - muscle pain  - joint pain    Respiratory:  - shortness of breath  - difficulty breathing  - cough    Neurological:  - loss of consciousness  - dizziness  - numbness  - tingling    Gastrointestinal:  - abdominal pain  - nausea  - vomiting  - constipation  - diarrhea  - bloody stools    Genitourinary:  - urinary incontinence  - increased urinary frequency  - painful urination  - blood in urine    Skin:  - Rash    Psychological:  - feelings of depression  - feelings of anxiety.         Last Recorded Vitals:  Vitals:    09/24/24 1517   BP: 92/64   BP Location: Left arm   Patient Position: Sitting   Weight: 85.7 kg (189 lb)         Past Medical History:  She has a past medical history of COVID-19 (04/07/2021), Encounter for screening for infections with a predominantly sexual mode of transmission (11/16/2020), Low back pain, unspecified (02/21/2020), Pain in right hip (02/21/2020), Personal history of diseases of the skin and subcutaneous tissue (10/08/2020), Pleurodynia (03/23/2021), Segmental and somatic dysfunction of cervical region (02/23/2020), Segmental and somatic dysfunction of head region (02/23/2020), Segmental and somatic dysfunction of lower extremity (02/23/2020), Segmental and somatic dysfunction of lumbar region (02/23/2020), Segmental and somatic dysfunction of pelvic region (02/23/2020), Segmental and somatic dysfunction of sacral region (02/23/2020), and Segmental and somatic dysfunction of thoracic region (02/23/2020).     Past Surgical History:  She has no past surgical history on file.     Social History:  She reports that she has never smoked. She has never used smokeless tobacco. She reports that she does not currently use alcohol. She reports that she does not currently use drugs after having used the following drugs: Marijuana.     Family History:  No family history on file.  Allergies:  Patient has no known allergies.     Outpatient Medications:  Current Outpatient Medications   Medication Instructions    buPROPion SR (WELLBUTRIN SR) 200 mg, oral, 2 times daily    clonazePAM (KlonoPIN) 0.5 mg tablet Take 1 tablet by mouth daily as needed for anxiety. Qty of 15 for 15 day supply with 1 refill.    prenatal vit,calc76-iron-folic (Prenatabs Rx) 29 mg iron- 1 mg tablet 1 tablet, oral, Daily        Physical Exam:  GENERAL: Well developed, well nourished, alert and cooperative, and appears to be in no acute distress.  PSYCH: mood pleasant and appropriate.  HEAD: normocephalic  EYES: EOMI. vision is grossly intact.  EARS: Hearing grossly intact. Right external auditory canals clear. Left external auditory canals  "clear. Bilateral TMs with clear effusion. No bulging or erythema appreciated. No cerumen impaction.  NOSE:  Nares patent b/l. No nasal discharge.  NECK: Neck supple, non-tender.   CARDIAC: RRR. No murmur.  LUNGS: Good respiratory effort. Clear to auscultation b/l without rales, rhonchi, wheezing.  ABD: soft, nontender. Gravid.   GAIT: Normal  EXTREMITIES: All 4 extremities are warm and well perfused. Peripheral pulses intact. No varicosities. No cyanosis, no pallor. No peripheral edema b/l. No calf tenderness b/l.  SKIN: Skin normal color.          Last Labs:  CBC -  Lab Results   Component Value Date    WBC 9.5 2024    HGB 13.6 2024    HCT 39.9 2024    MCV 91 2024     2024        CMP -  Lab Results   Component Value Date    CALCIUM 9.7 2021        LIPID PANEL -  No results found for: \"CHOL\", \"TRIG\", \"HDL\", \"CHHDL\", \"LDLF\", \"VLDL\", \"NHDL\"        Lab Results   Component Value Date    BNP 71 2024          US OB follow UP transabdominal approach  Interpreted by: Wilman Phoenix  Indication  ========    Growth for Class I Obesity (BMI 30-34.9). Previous Fetal Demise, Abnormal Ultrasound Finding on Current or Previous Ultrasound  History  ======    General History  Smoking: no  Height 165 cm  Height (ft) 5 ft  Height (in) 5 in  Previous Outcomes   3  Para 0  Abortions (A) 1  Living children (L) 0  Stillbirths 1  Terminations 1  Other: HX IUFD @ 23 weeks  Maternal Assessment  =================    Height 165 cm  Height (ft) 5 ft  Height (in) 5 in  Weight 84 kg  Weight (lb) 185 lb  Weight gain 0 kg  Weight gain (lb) 0 lb  BMI 30.79 kg/mï¿½  Physical Exam  Initial weight (lb) 185 lb  Pregnancy  =========    Savage pregnancy. Number of fetuses: 1  Dating  ======    LMP on: 2024  Cycle: LMP date uncertain  GA by LMP 23 w + 1 d  ALENA by LMP: 2025  Conception: LMP  Ultrasound examination on: 2024  GA by U/S based upon: AC, BPD, Femur, HC  GA by U/S 23 w + 1 " d  ALENA by U/S: 1/12/2025  Assigned: based on ultrasound (CRL), selected on 06/17/2024  Assigned GA 23 w + 2 d  Assigned ALENA: 1/11/2025  Fetal Growth Overview  =================    Exam date        GA              BPD (mm)          HC (mm)              AC (mm)               FL (mm)             HL (mm)        EFW (g)  08/27/2024        20w 3d        46.7     36%        176.2    28%        142.4     19%        33.2    40%                             325    23%  09/16/2024        23w 2d        57.3     54%        204.6    12%        181.5     32%        41.1    40%                             563    34%  Impression  =========    Follow up growth in patient with history of unexplained stillbirth at 23 weeks, possible maternal pulmonary stenosis (maternal ECHO pending), absent nasal bone seen at  the anatomy with low risk serum screening.    -Appropriate fetal growth and AFV  -No malformations were identified on a limited survey  - The nasal bone appears hypoplastic today    The findings were re-reviewed with the patient today. I reviewed the prior findings in the context of her prior screening result. I reviewed the role of genetic counseling and this  was declined today.    Thank you allowing us to participate in the care of your patient  Follow-up  ========    A repeat evaluation has been scheduled in 4 weeks.  General Evaluation  ==============    Cardiac activity present.  bpm. Fetal movements: visualized. Presentation: breech  Placenta: Placental site: anterior, No Previa Seen  Umbilical cord: Cord vessels: 3 vessel cord  Amniotic fluid: Amount of AF: normal amount. MVP 4.5 cm. KELLY 15.8 cm. Q1 4.1 cm, Q2 3.9 cm, Q3 4.5 cm, Q4 3.3 cm  Fetal Biometry  ============    Standard  BPD 57.3 mm 23w 4d 54% Hadlock  OFD 71.0 mm  14% INTERGROWTH-21st  .6 mm 22w 4d 12% Hadlock  Cerebellum tr 26.9 mm 24w 2d 85% Soria  .5 mm 23w 0d 32% Hadlock  Femur 41.1 mm 23w 2d 40% Hadlock  HC / AC 1.13   g 23w 0d  34% Hadlock  EFW (lb) 1 lb  EFW (oz) 4 oz  EFW by: Hadlock (BPD-HC-AC-FL)  Extended   7.9 mm  Head / Face / Neck  Cephalic index 0.81  73% Nicolaides  Extremities / Bony Struc  FL / BPD 0.72  FL / HC 0.20  FL / AC 0.23  Other Structures   bpm  Fetal Anatomy  ===========    Cranium: Normal  Lateral ventricles: Normal  Midline falx: Normal  Cavum septi pellucidi: Normal  Cerebellum: Normal  Cisterna magna: Normal  Head / Neck  Rt lateral ventricle: Normal  Lt lateral ventricle: Normal  Thalami: Normal  Cerebellar lobes: Normal  4-chamber view: Normal  3-vessel view: Normal  Heart / Thorax  Cardiac axis: Normal  Diaphragm: Normal  Stomach: Normal  Kidneys: Normal  Bladder: visualized  Abdomen  Stomach: correct situs  Rt kidney: Normal  Lt kidney: Normal  Large bowel: Normal  Fetal sex: female  Wants to know fetal sex: yes  Maternal Structures  ===============    Uterus / Cervix  Uterus: Normal  Cervix: Not visualized  Ovaries / Tubes / Adnexa  Rt ovary: Visualized  Rt ovary details: Normal  Lt ovary: Visualized  Lt ovary details: Normal  Method  ======    Transabdominal ultrasound examination. View: Suboptimal view: limited by late gestational age         Assessment/Plan   Problem List Items Addressed This Visit    None  Visit Diagnoses         Codes    Sinus congestion    -  Primary R09.81          Sinus congestion.   Allergic rhinitis   Will start patient on Flonase 1-2x daily along with cetirizine daily.   Upon review of up-to-date, Flonase and Zyrtec low risk in pregnancy discussed with patient.  Agreed on prescription for both medications.      Follow up in the office as needed       Wilman Yan DO

## 2024-09-25 ENCOUNTER — TELEMEDICINE (OUTPATIENT)
Dept: BEHAVIORAL HEALTH | Facility: CLINIC | Age: 29
End: 2024-09-25
Payer: MEDICARE

## 2024-09-25 DIAGNOSIS — F32.A DEPRESSION AFFECTING PREGNANCY IN FIRST TRIMESTER, ANTEPARTUM (HHS-HCC): ICD-10-CM

## 2024-09-25 DIAGNOSIS — F41.9 ANXIETY: ICD-10-CM

## 2024-09-25 DIAGNOSIS — F90.0 ATTENTION DEFICIT HYPERACTIVITY DISORDER (ADHD), PREDOMINANTLY INATTENTIVE TYPE: ICD-10-CM

## 2024-09-25 DIAGNOSIS — O99.341 DEPRESSION AFFECTING PREGNANCY IN FIRST TRIMESTER, ANTEPARTUM (HHS-HCC): ICD-10-CM

## 2024-09-25 PROCEDURE — 99215 OFFICE O/P EST HI 40 MIN: CPT | Performed by: STUDENT IN AN ORGANIZED HEALTH CARE EDUCATION/TRAINING PROGRAM

## 2024-09-25 RX ORDER — CLONAZEPAM 0.5 MG/1
TABLET ORAL
Qty: 15 TABLET | Refills: 1 | Status: SHIPPED | OUTPATIENT
Start: 2024-09-25

## 2024-09-25 NOTE — PROGRESS NOTES
Subjective    All Individuals Present: Patient and Provider (Encounter Provider)     Zoraida Martinez is a 29 y.o. @24wga woman  with depression, anxiety, ADHD, and a history of IUFD last seen 8/15/24; at that time recommended continuing bupropion 200 mg BID and clonazepam 0.25 mg daily PRN for panic     Endorses daily adherence to buproprion 200 mg BID and clonazepam 0.5 mg; alternates between taking full tablet once a day and sometimes taking half a pill twice a day. States that she's been on clonazepam for years since she was 20.   Baby is developing appropriately, looking forward to delivering in December. 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 Takes a long time to fall asleep, but averaging 7-8h sleep/night.   -Gets depressed when she thinks about her partner leaving her, but otherwise feels that depression is well managed   -Planning to stop working in the middle of December, due date is early January   -She's been told she can deliver at 37 weeks; Zoraida is shooting for mid-December   -Currently in therapy, finds it helpful     Medication side effects: None Reported    Past Psychiatric Hx:  Dx: Depression, anxiety, ADHD  -Hospitalization: once,  immediately after IUFD, there for 6 days, discharged on lamotrigine, quetiapine  -No SA, some SIB by cutting as teenager, last time was right after IUFD  -Therapist: none currently  -Rx trials: sertraline (no benefit), fluoxetine (more anxious), lamotrigine (breakout rash on hands), quetiapine (helped for sleep after hospital), alprazolam (too sedating), was previously given clonazepam at 19 yo for ADHD ostensibly (clarified was clonazepam and not clonidine), clonazepam worked better than alprazolam because less sedating and longer lasting, was on for years, was stopped after inpatient hospitalization, hydroxyzine while in the hospital (very groggy/sedated)  -briefly saw  [facility] after hospitalization    Patient Active Problem List   Diagnosis    Supervision of high risk pregnancy in first trimester ([facility])    Depression affecting pregnancy in first trimester, antepartum ([facility])    History of IUFD    Rh negative state in antepartum period ([facility])    Pulmonary valve stenosis    Migraine without status migrainosus, not intractable      Current Outpatient Medications on File Prior to Visit   Medication Sig Dispense Refill    buPROPion SR (Wellbutrin SR) 200 mg 12 hr tablet Take 1 tablet (200 mg) by mouth 2 times a day. 180 tablet 3    cetirizine (ZyrTEC) 10 mg tablet Take 1 tablet (10 mg) by mouth once daily. 90 tablet 0    clonazePAM (KlonoPIN) 0.5 mg tablet Take 1 tablet by mouth daily as needed for anxiety. Qty of 15 for 15 day supply with 1 refill. 15 tablet 1    fluticasone (Flonase) 50 mcg/actuation nasal spray Administer 1 spray into each nostril once daily. Shake gently. Before first use, prime pump. After use, clean tip and replace cap. 16 g 5    prenatal vit,calc76-iron-folic (Prenatabs Rx) 29 mg iron- 1 mg tablet Take 1 tablet by mouth once daily.       No current facility-administered medications on file prior to visit.        No Known Allergies  seasonal    No past surgical history on file.  none    No family history on file.   Mom- depression, ADHD  Maternal grandmother- schizophrenia, dementia  Maternal great grandfather- schizophrenia      Social History     Socioeconomic History    Marital status: Single     Spouse name: Not on file    Number of children: Not on file    Years of education: Not on file    Highest education level: Not on file   Occupational History    Not on file   Tobacco Use    Smoking status: Never    Smokeless tobacco: Never   Vaping Use    Vaping status: Never Used   Substance and Sexual Activity    Alcohol use: Not Currently    Drug use: Not Currently     Types: Marijuana    Sexual activity: Not on file   Other Topics Concern    Not on  "file   Social History Narrative    Not on file     Social Determinants of Health     Financial Resource Strain: Not on file   Food Insecurity: Not on file   Transportation Needs: Not on file   Physical Activity: Not on file   Stress: Not on file   Social Connections: Not on file   Intimate Partner Violence: Not on file      Currently working bartending  Partner just left her when pt wouldn't get an .  Cousin and close friend are two biggest support systems.  Plans to go back to school for sports medicine    Substances:  -no tobacco  -no EtOH (stopped before pregnancy)  -no cananbis, no illicits  -1-2 cans Red Bull per day    OB History    Para Term  AB Living   3 1   1 1     SAB IAB Ectopic Multiple Live Births   0 1            # Outcome Date GA Lbr Tim/2nd Weight Sex Type Anes PTL Lv   3 Current            2      M Vag-Spont   FD   1 IAB                  Review of Systems  Constitutional: Positive for fatigue, Negative for fevers  Psychiatric: Positive for anxiety, depression, fatigue, sleep disturbance, and inattention, Negative for decreased appetite and irritability  Neurological: Positive for headaches   Other: @19wga; pulmonary valve stenosis    Objective   LMP  (LMP Unknown)   Wt Readings from Last 4 Encounters:   24 85.7 kg (189 lb)   24 86.6 kg (191 lb)   24 86.2 kg (190 lb)   07/15/24 83.5 kg (184 lb)       Mental Status Exam  General Appearance: Well groomed, appropriate eye contact. For virtual interview  Attitude/Behavior: Cooperative, conversant, engaged, and with good eye contact.  Motor: No psychomotor agitation or retardation, no tremor or other abnormal movements.  Speech: Normal rate, volume, prosody  Mood: \"fine\"   Affect: Dysphoric, constricted but reactive, Anxious, and Congruent with mood and topic of conversation  Thought Process: Linear and goal-oriented   Thought Associations: No loosening of associations  Thought Content: normal  Insight: " fair  Judgment: Intact  Cognition: No gross deficits noted in conversation    Laboratory/Imaging/Diagnostic Tests   OARRS: 24 clonazepam 0.5 mg tablets; 15 dispensed; 24 also    Assessment/Plan   Overall Formulation and Differential Diagnosis:  Zoraida Martinez is a 29 y.o. @24wga woman  with depression, anxiety, ADHD (never tried stimulants), and a history of IUFD (her only psychiatric hospitalization directly followed IUFD, Mother's day ), presenting for followup.    8/15/24: pt's partner left her because she didn't want an , she was very anxious. recommended continuing bupropion 200 mg BID and clonazepam 0.25 mg daily PRN for panic    -24: Pt has been taking buproprion 200 mg BID 0.5 mg clonazepam daily for anxiety (either the full pill once or half a pill twice a day) and finds this helpful. Reports that she's been on and off of clonazepam since she was 20, and estimates that she's been on it more than off of it for the past nine years. We discussed the long-term risk of benzos worsening depression and anxiety; pt acknowledged these risks and wants to continue current treatment.     Plan:  -Continue bupropion 200 mg BID; revisit changing formulation after delivery   -Continue clonazepam 0.5 mg PO daily PRN panic  -RTC 4 weeks    I saw this patient with Dr. Jenkins, who agrees with the above reccs.     Risk Assessment:  Imminent Risk of Suicide or Serious Self-Injury: Low Risk -- Risk factors include: Depression, History of trauma or abuse , Lack of social supports , Loss (relational, social, occupational, financial) , Medical illness comorbidity , and Severe anxiety Protective factors include:Denies current suicidal ideation, Denies history of suicide attempts , Future-oriented talk , Willingness to seek help and support , Skills in problem solving, conflict resolution, and nonviolent handling of disputes, Cultural and Latter-day beliefs that discourage suicide and support  self-preservation , Access to a variety of clinical interventions , Receiving and engaged in care for mental, physical, and substance use disorders , History of adhering to treatment recommendations and/or prescribed medication regimen , Support through ongoing medical and mental healthcare relationships , Current/history of good response to treatment/meds , and Restricted access to firearms or other lethal means of suicide   Imminent Risk of Violence or Homicide: Low Risk - Risk factors include: No significant risk factors identified on screening. Protective factors include: Lack of known history of harm to others , Lack of known history of violent ideation , and Lack of known access to firearms   Treatment Plan:  There are no recently modified care plans to display for this patient.      Attestation Statements   Number of Minutes Spent Performing Evaluation & Management (E&M): 60

## 2024-09-26 ENCOUNTER — HOSPITAL ENCOUNTER (OUTPATIENT)
Dept: CARDIOLOGY | Facility: CLINIC | Age: 29
Discharge: HOME | End: 2024-09-26
Payer: MEDICARE

## 2024-09-26 VITALS — WEIGHT: 191 LBS | BODY MASS INDEX: 31.78 KG/M2

## 2024-09-26 DIAGNOSIS — Z86.79 HISTORY OF PULMONARY ARTERY STENOSIS: ICD-10-CM

## 2024-09-26 DIAGNOSIS — I37.0 NONRHEUMATIC PULMONARY VALVE STENOSIS: ICD-10-CM

## 2024-09-26 PROCEDURE — 93306 TTE W/DOPPLER COMPLETE: CPT

## 2024-09-26 PROCEDURE — 93306 TTE W/DOPPLER COMPLETE: CPT | Performed by: INTERNAL MEDICINE

## 2024-09-27 DIAGNOSIS — Z00.00 WELL ADULT EXAM: Primary | ICD-10-CM

## 2024-09-27 NOTE — PROGRESS NOTES
I saw and evaluated the patient. I personally obtained the key and critical portions of the history and physical exam or was physically present for key and critical portions performed by the resident/fellow. I reviewed the resident/fellow's documentation and discussed the patient with the resident/fellow. I agree with the resident/fellow's medical decision making as documented in the note.    Martinez Jenkins MD

## 2024-09-28 LAB
AORTIC VALVE MEAN GRADIENT: 4 MMHG
AORTIC VALVE PEAK VELOCITY: 1.38 M/S
AV PEAK GRADIENT: 7.6 MMHG
AVA (PEAK VEL): 2.22 CM2
AVA (VTI): 2.19 CM2
EJECTION FRACTION APICAL 4 CHAMBER: 63.9
EJECTION FRACTION: 60 %
LEFT VENTRICLE INTERNAL DIMENSION DIASTOLE: 4.5 CM (ref 3.5–6)
LEFT VENTRICULAR OUTFLOW TRACT DIAMETER: 2.11 CM
MITRAL VALVE E/A RATIO: 1.48
RIGHT VENTRICLE FREE WALL PEAK S': 20 CM/S
RIGHT VENTRICLE PEAK SYSTOLIC PRESSURE: 20.6 MMHG
TRICUSPID ANNULAR PLANE SYSTOLIC EXCURSION: 2.7 CM

## 2024-09-30 ENCOUNTER — APPOINTMENT (OUTPATIENT)
Dept: RADIOLOGY | Facility: CLINIC | Age: 29
End: 2024-09-30
Payer: MEDICARE

## 2024-10-07 ENCOUNTER — APPOINTMENT (OUTPATIENT)
Dept: OBSTETRICS AND GYNECOLOGY | Facility: CLINIC | Age: 29
End: 2024-10-07
Payer: MEDICARE

## 2024-10-09 ENCOUNTER — HOSPITAL ENCOUNTER (OUTPATIENT)
Dept: RADIOLOGY | Facility: CLINIC | Age: 29
Discharge: HOME | End: 2024-10-09
Payer: MEDICARE

## 2024-10-09 DIAGNOSIS — O09.91 SUPERVISION OF HIGH RISK PREGNANCY IN FIRST TRIMESTER (HHS-HCC): ICD-10-CM

## 2024-10-09 PROCEDURE — 76816 OB US FOLLOW-UP PER FETUS: CPT

## 2024-10-09 PROCEDURE — 76816 OB US FOLLOW-UP PER FETUS: CPT | Performed by: OBSTETRICS & GYNECOLOGY

## 2024-10-11 ENCOUNTER — APPOINTMENT (OUTPATIENT)
Dept: OBSTETRICS AND GYNECOLOGY | Facility: CLINIC | Age: 29
End: 2024-10-11
Payer: MEDICARE

## 2024-10-11 ENCOUNTER — LAB (OUTPATIENT)
Dept: LAB | Facility: LAB | Age: 29
End: 2024-10-11
Payer: MEDICARE

## 2024-10-11 VITALS — BODY MASS INDEX: 32.78 KG/M2 | SYSTOLIC BLOOD PRESSURE: 110 MMHG | WEIGHT: 197 LBS | DIASTOLIC BLOOD PRESSURE: 60 MMHG

## 2024-10-11 DIAGNOSIS — Z23 NEED FOR TDAP VACCINATION: ICD-10-CM

## 2024-10-11 DIAGNOSIS — G43.909 MIGRAINE WITHOUT STATUS MIGRAINOSUS, NOT INTRACTABLE, UNSPECIFIED MIGRAINE TYPE: ICD-10-CM

## 2024-10-11 DIAGNOSIS — Z23 NEED FOR INFLUENZA VACCINATION: ICD-10-CM

## 2024-10-11 DIAGNOSIS — O09.91 SUPERVISION OF HIGH RISK PREGNANCY IN FIRST TRIMESTER (HHS-HCC): ICD-10-CM

## 2024-10-11 DIAGNOSIS — I37.0 PULMONARY VALVE STENOSIS, UNSPECIFIED ETIOLOGY: Primary | ICD-10-CM

## 2024-10-11 DIAGNOSIS — Z87.59 HISTORY OF IUFD: ICD-10-CM

## 2024-10-11 DIAGNOSIS — O99.341 DEPRESSION AFFECTING PREGNANCY IN FIRST TRIMESTER, ANTEPARTUM (HHS-HCC): ICD-10-CM

## 2024-10-11 DIAGNOSIS — O26.899 RH NEGATIVE STATE IN ANTEPARTUM PERIOD (HHS-HCC): ICD-10-CM

## 2024-10-11 DIAGNOSIS — Z67.91 RH NEGATIVE STATE IN ANTEPARTUM PERIOD (HHS-HCC): ICD-10-CM

## 2024-10-11 DIAGNOSIS — O09.92 SUPERVISION OF HIGH RISK PREGNANCY IN SECOND TRIMESTER (HHS-HCC): ICD-10-CM

## 2024-10-11 DIAGNOSIS — F32.A DEPRESSION AFFECTING PREGNANCY IN FIRST TRIMESTER, ANTEPARTUM (HHS-HCC): ICD-10-CM

## 2024-10-11 LAB
ERYTHROCYTE [DISTWIDTH] IN BLOOD BY AUTOMATED COUNT: 12.3 % (ref 11.5–14.5)
GLUCOSE 1H P 50 G GLC PO SERPL-MCNC: 81 MG/DL
HCT VFR BLD AUTO: 34.3 % (ref 36–46)
HGB BLD-MCNC: 11.5 G/DL (ref 12–16)
MCH RBC QN AUTO: 31.4 PG (ref 26–34)
MCHC RBC AUTO-ENTMCNC: 33.5 G/DL (ref 32–36)
MCV RBC AUTO: 94 FL (ref 80–100)
NRBC BLD-RTO: 0 /100 WBCS (ref 0–0)
PLATELET # BLD AUTO: 193 X10*3/UL (ref 150–450)
RBC # BLD AUTO: 3.66 X10*6/UL (ref 4–5.2)
REFLEX ADDED, ANEMIA PANEL: NORMAL
TREPONEMA PALLIDUM IGG+IGM AB [PRESENCE] IN SERUM OR PLASMA BY IMMUNOASSAY: NONREACTIVE
WBC # BLD AUTO: 7.6 X10*3/UL (ref 4.4–11.3)

## 2024-10-11 PROCEDURE — 82947 ASSAY GLUCOSE BLOOD QUANT: CPT

## 2024-10-11 PROCEDURE — 36415 COLL VENOUS BLD VENIPUNCTURE: CPT

## 2024-10-11 PROCEDURE — 85027 COMPLETE CBC AUTOMATED: CPT

## 2024-10-11 PROCEDURE — 86780 TREPONEMA PALLIDUM: CPT

## 2024-10-11 NOTE — PROGRESS NOTES
Ob Follow-up  10/11/24     SUBJECTIVE      HPI: Zoraida Martinez is a 29 y.o.  at 26w6d here for RPNV. She has  very high levels of stress from work. Started a new job and is training. Work hours are irregular and has trouble sleeping when she gets home. She is working with psychiatry for her anxiety. The klonopin and bupropion help slightly.       OBJECTIVE  Visit Vitals  /60   Wt 89.4 kg (197 lb)   LMP  (LMP Unknown)   BMI 32.78 kg/m²   OB Status Pregnant   Smoking Status Never   BSA 2.02 m²            ASSESSMENT & PLAN    Zoraida Martinez is a 29 y.o.  at 26w6d here for the following concerns we addressed today:    Problem List Items Addressed This Visit       Supervision of high risk pregnancy in first trimester ([facility])    Overview     [x] Dating: by 10 week ultrasound (LMP unknown)  [] Initial BMI: need height to calculate  [x] Prenatal Labs: reviewed, Rh negative, Rubella immune, Hgb 13.6  [x] Pap: 2024 ASCUS/+HR HPV, colpo performed and no high grade abnormalities suspected  [x] Aneuploidy Screening: normal first trimester screen  [x] Baby ASA: not indicated, one moderate risk factor  [x] Anatomy US:  absent nasal bone, declines genetic counseling/testing  [] 1hr GCT at 24-28wks: pending from today   [x] Tdap (27-36wks): Given 10/11/24  [x] Flu Shot: Given 10/11/24  [] RSV vaccine:   [] Rhogam (if Rh neg): will return for vaccine next week  [] Third trimester growth: plan for serial growth   [] GBS at 36 wks:  [] Breastfeeding  [] PPBC:   [] 39 weeks discussion of IOL vs. Expectant management:  [] Mode of delivery:            Depression affecting pregnancy in first trimester, antepartum ([facility])    Overview     - previously diagnosed with anxiety, depression, ADHD, PTSD  - on wellbutin 200mg BID and klonipin PRN  - following with Dr. Jenkins         History of IUFD    Overview     - 23 weeks fetal demise, delivered by   - No clear etiology based on chart review, normal microarray and  placental pathology, no autopsy performed  - normal lupus anticoagulant, anticardiolipin antibodies and anti-B2 glycoprotein  - plan for serial growth ultrasound after 24 weeks   - plan for  testing at 32 weeks (can consider as early as 24 weeks)  - plan for delivery no later than 39 weeks. Can consider at 37 weeks for maternal anxiety          Rh negative state in antepartum period ([facility])    Overview     Will return for nurse visit in 1 week for rhogam         Pulmonary valve stenosis - Primary    Overview     S/p MFM Consult  Normal maternal echo and BNP  No need for telemetry in labor or SBE prophylaxis           Migraine without status migrainosus, not intractable    Overview     Reviewed safe medications in pregnancy          Other Visit Diagnoses       Need for influenza vaccination        Relevant Orders    Flu vaccine, trivalent, preservative free, age 6 months and greater (Fluarix/Fluzone/Flulaval)    Need for Tdap vaccination        Relevant Orders    Tdap vaccine, age 7 years and older            RTC in 3 weeks      Ashlee Cyr MD

## 2024-10-18 ENCOUNTER — APPOINTMENT (OUTPATIENT)
Dept: OBSTETRICS AND GYNECOLOGY | Facility: CLINIC | Age: 29
End: 2024-10-18
Payer: MEDICARE

## 2024-10-28 DIAGNOSIS — F41.9 ANXIETY: ICD-10-CM

## 2024-10-28 RX ORDER — CLONAZEPAM 0.5 MG/1
TABLET ORAL
Qty: 15 TABLET | Refills: 1 | Status: SHIPPED | OUTPATIENT
Start: 2024-10-28

## 2024-10-31 ENCOUNTER — APPOINTMENT (OUTPATIENT)
Dept: OBSTETRICS AND GYNECOLOGY | Facility: CLINIC | Age: 29
End: 2024-10-31
Payer: MEDICARE

## 2024-10-31 VITALS — BODY MASS INDEX: 33.45 KG/M2 | DIASTOLIC BLOOD PRESSURE: 64 MMHG | SYSTOLIC BLOOD PRESSURE: 118 MMHG | WEIGHT: 201 LBS

## 2024-10-31 DIAGNOSIS — G43.909 MIGRAINE WITHOUT STATUS MIGRAINOSUS, NOT INTRACTABLE, UNSPECIFIED MIGRAINE TYPE: ICD-10-CM

## 2024-10-31 DIAGNOSIS — O09.91 SUPERVISION OF HIGH RISK PREGNANCY IN FIRST TRIMESTER (HHS-HCC): ICD-10-CM

## 2024-10-31 DIAGNOSIS — I37.0 PULMONARY VALVE STENOSIS, UNSPECIFIED ETIOLOGY: ICD-10-CM

## 2024-10-31 DIAGNOSIS — O26.899 RH NEGATIVE STATE IN ANTEPARTUM PERIOD (HHS-HCC): Primary | ICD-10-CM

## 2024-10-31 DIAGNOSIS — F32.A DEPRESSION AFFECTING PREGNANCY IN FIRST TRIMESTER, ANTEPARTUM (HHS-HCC): ICD-10-CM

## 2024-10-31 DIAGNOSIS — O99.341 DEPRESSION AFFECTING PREGNANCY IN FIRST TRIMESTER, ANTEPARTUM (HHS-HCC): ICD-10-CM

## 2024-10-31 DIAGNOSIS — Z87.59 HISTORY OF IUFD: ICD-10-CM

## 2024-10-31 DIAGNOSIS — Z67.91 RH NEGATIVE STATE IN ANTEPARTUM PERIOD (HHS-HCC): Primary | ICD-10-CM

## 2024-11-06 ENCOUNTER — HOSPITAL ENCOUNTER (OUTPATIENT)
Dept: RADIOLOGY | Facility: CLINIC | Age: 29
Discharge: HOME | End: 2024-11-06
Payer: MEDICARE

## 2024-11-06 DIAGNOSIS — O09.91 SUPERVISION OF HIGH RISK PREGNANCY IN FIRST TRIMESTER (HHS-HCC): ICD-10-CM

## 2024-11-06 DIAGNOSIS — O99.213 OBESITY COMPLICATING PREGNANCY, THIRD TRIMESTER (HHS-HCC): ICD-10-CM

## 2024-11-06 PROCEDURE — 76816 OB US FOLLOW-UP PER FETUS: CPT

## 2024-11-06 PROCEDURE — 76816 OB US FOLLOW-UP PER FETUS: CPT | Performed by: STUDENT IN AN ORGANIZED HEALTH CARE EDUCATION/TRAINING PROGRAM

## 2024-11-06 PROCEDURE — 76819 FETAL BIOPHYS PROFIL W/O NST: CPT | Performed by: STUDENT IN AN ORGANIZED HEALTH CARE EDUCATION/TRAINING PROGRAM

## 2024-11-06 PROCEDURE — 76819 FETAL BIOPHYS PROFIL W/O NST: CPT

## 2024-11-14 ENCOUNTER — APPOINTMENT (OUTPATIENT)
Dept: OBSTETRICS AND GYNECOLOGY | Facility: CLINIC | Age: 29
End: 2024-11-14
Payer: MEDICARE

## 2024-11-21 ENCOUNTER — ROUTINE PRENATAL (OUTPATIENT)
Dept: OBSTETRICS AND GYNECOLOGY | Facility: CLINIC | Age: 29
End: 2024-11-21
Payer: MEDICAID

## 2024-11-21 ENCOUNTER — HOSPITAL ENCOUNTER (OUTPATIENT)
Facility: HOSPITAL | Age: 29
End: 2024-11-21
Attending: OBSTETRICS & GYNECOLOGY | Admitting: OBSTETRICS & GYNECOLOGY
Payer: MEDICARE

## 2024-11-21 ENCOUNTER — HOSPITAL ENCOUNTER (OUTPATIENT)
Facility: HOSPITAL | Age: 29
Discharge: HOME | End: 2024-11-21
Attending: OBSTETRICS & GYNECOLOGY | Admitting: OBSTETRICS & GYNECOLOGY
Payer: MEDICARE

## 2024-11-21 VITALS
SYSTOLIC BLOOD PRESSURE: 118 MMHG | HEIGHT: 68 IN | BODY MASS INDEX: 30.1 KG/M2 | HEART RATE: 82 BPM | RESPIRATION RATE: 18 BRPM | TEMPERATURE: 98.1 F | OXYGEN SATURATION: 98 % | DIASTOLIC BLOOD PRESSURE: 74 MMHG | WEIGHT: 198.63 LBS

## 2024-11-21 VITALS — SYSTOLIC BLOOD PRESSURE: 100 MMHG | WEIGHT: 200 LBS | DIASTOLIC BLOOD PRESSURE: 62 MMHG | BODY MASS INDEX: 33.28 KG/M2

## 2024-11-21 DIAGNOSIS — Z67.91 RH NEGATIVE STATE IN ANTEPARTUM PERIOD (HHS-HCC): ICD-10-CM

## 2024-11-21 DIAGNOSIS — O09.93 SUPERVISION OF HIGH RISK PREGNANCY IN THIRD TRIMESTER (HHS-HCC): Primary | ICD-10-CM

## 2024-11-21 DIAGNOSIS — Z87.59 HISTORY OF IUFD: ICD-10-CM

## 2024-11-21 DIAGNOSIS — G43.909 MIGRAINE WITHOUT STATUS MIGRAINOSUS, NOT INTRACTABLE, UNSPECIFIED MIGRAINE TYPE: ICD-10-CM

## 2024-11-21 DIAGNOSIS — I37.0 PULMONARY VALVE STENOSIS, UNSPECIFIED ETIOLOGY: ICD-10-CM

## 2024-11-21 DIAGNOSIS — O99.341 DEPRESSION AFFECTING PREGNANCY IN FIRST TRIMESTER, ANTEPARTUM (HHS-HCC): ICD-10-CM

## 2024-11-21 DIAGNOSIS — O26.899 RH NEGATIVE STATE IN ANTEPARTUM PERIOD (HHS-HCC): ICD-10-CM

## 2024-11-21 DIAGNOSIS — F32.A DEPRESSION AFFECTING PREGNANCY IN FIRST TRIMESTER, ANTEPARTUM (HHS-HCC): ICD-10-CM

## 2024-11-21 LAB
POC APPEARANCE, URINE: CLEAR
POC BILIRUBIN, URINE: NEGATIVE
POC BLOOD, URINE: NEGATIVE
POC COLOR, URINE: YELLOW
POC GLUCOSE, URINE: NEGATIVE MG/DL
POC KETONES, URINE: NEGATIVE MG/DL
POC LEUKOCYTES, URINE: ABNORMAL
POC NITRITE,URINE: NEGATIVE
POC PH, URINE: 8.5 PH
POC PROTEIN, URINE: ABNORMAL MG/DL
POC SPECIFIC GRAVITY, URINE: 1.02
POC UROBILINOGEN, URINE: 0.2 EU/DL

## 2024-11-21 PROCEDURE — 81002 URINALYSIS NONAUTO W/O SCOPE: CPT

## 2024-11-21 PROCEDURE — 99213 OFFICE O/P EST LOW 20 MIN: CPT | Performed by: OBSTETRICS & GYNECOLOGY

## 2024-11-21 PROCEDURE — 59025 FETAL NON-STRESS TEST: CPT | Performed by: OBSTETRICS & GYNECOLOGY

## 2024-11-21 PROCEDURE — 99213 OFFICE O/P EST LOW 20 MIN: CPT

## 2024-11-21 PROCEDURE — 2500000001 HC RX 250 WO HCPCS SELF ADMINISTERED DRUGS (ALT 637 FOR MEDICARE OP)

## 2024-11-21 PROCEDURE — 59025 FETAL NON-STRESS TEST: CPT

## 2024-11-21 PROCEDURE — 99215 OFFICE O/P EST HI 40 MIN: CPT | Mod: 25

## 2024-11-21 RX ORDER — CLONAZEPAM 0.5 MG/1
0.5 TABLET ORAL ONCE
Status: COMPLETED | OUTPATIENT
Start: 2024-11-21 | End: 2024-11-21

## 2024-11-21 RX ADMIN — CLONAZEPAM 0.5 MG: 0.5 TABLET ORAL at 19:16

## 2024-11-21 SDOH — SOCIAL STABILITY: SOCIAL INSECURITY: DO YOU FEEL ANYONE HAS EXPLOITED OR TAKEN ADVANTAGE OF YOU FINANCIALLY OR OF YOUR PERSONAL PROPERTY?: NO

## 2024-11-21 SDOH — SOCIAL STABILITY: SOCIAL INSECURITY: HAVE YOU HAD ANY THOUGHTS OF HARMING ANYONE ELSE?: NO

## 2024-11-21 SDOH — SOCIAL STABILITY: SOCIAL INSECURITY: VERBAL ABUSE: YES, PAST (COMMENT)

## 2024-11-21 SDOH — SOCIAL STABILITY: SOCIAL INSECURITY: HAVE YOU HAD THOUGHTS OF HARMING ANYONE ELSE?: NO

## 2024-11-21 SDOH — HEALTH STABILITY: MENTAL HEALTH: NON-SPECIFIC ACTIVE SUICIDAL THOUGHTS (PAST 1 MONTH): NO

## 2024-11-21 SDOH — SOCIAL STABILITY: SOCIAL INSECURITY: HAS ANYONE EVER THREATENED TO HURT YOUR FAMILY OR YOUR PETS?: NO

## 2024-11-21 SDOH — SOCIAL STABILITY: SOCIAL INSECURITY: ABUSE SCREEN: ADULT

## 2024-11-21 SDOH — HEALTH STABILITY: MENTAL HEALTH: WERE YOU ABLE TO COMPLETE ALL THE BEHAVIORAL HEALTH SCREENINGS?: YES

## 2024-11-21 SDOH — SOCIAL STABILITY: SOCIAL INSECURITY: DOES ANYONE TRY TO KEEP YOU FROM HAVING/CONTACTING OTHER FRIENDS OR DOING THINGS OUTSIDE YOUR HOME?: NO

## 2024-11-21 SDOH — HEALTH STABILITY: MENTAL HEALTH: SUICIDAL BEHAVIOR (LIFETIME): NO

## 2024-11-21 SDOH — ECONOMIC STABILITY: HOUSING INSECURITY: DO YOU FEEL UNSAFE GOING BACK TO THE PLACE WHERE YOU ARE LIVING?: NO

## 2024-11-21 SDOH — SOCIAL STABILITY: SOCIAL INSECURITY: ARE THERE ANY APPARENT SIGNS OF INJURIES/BEHAVIORS THAT COULD BE RELATED TO ABUSE/NEGLECT?: NO

## 2024-11-21 SDOH — SOCIAL STABILITY: SOCIAL INSECURITY: ARE YOU OR HAVE YOU BEEN THREATENED OR ABUSED PHYSICALLY, EMOTIONALLY, OR SEXUALLY BY ANYONE?: YES

## 2024-11-21 SDOH — SOCIAL STABILITY: SOCIAL INSECURITY: PHYSICAL ABUSE: YES, PAST (COMMENT)

## 2024-11-21 SDOH — HEALTH STABILITY: MENTAL HEALTH: WISH TO BE DEAD (PAST 1 MONTH): NO

## 2024-11-21 ASSESSMENT — PAIN SCALES - GENERAL
PAINLEVEL_OUTOF10: 0 - NO PAIN
PAINLEVEL_OUTOF10: 0 - NO PAIN

## 2024-11-21 ASSESSMENT — PATIENT HEALTH QUESTIONNAIRE - PHQ9
1. LITTLE INTEREST OR PLEASURE IN DOING THINGS: NOT AT ALL
SUM OF ALL RESPONSES TO PHQ9 QUESTIONS 1 & 2: 0
2. FEELING DOWN, DEPRESSED OR HOPELESS: NOT AT ALL

## 2024-11-21 ASSESSMENT — LIFESTYLE VARIABLES
HOW OFTEN DO YOU HAVE 6 OR MORE DRINKS ON ONE OCCASION: NEVER
AUDIT-C TOTAL SCORE: 0
HOW MANY STANDARD DRINKS CONTAINING ALCOHOL DO YOU HAVE ON A TYPICAL DAY: PATIENT DOES NOT DRINK
SKIP TO QUESTIONS 9-10: 1
AUDIT-C TOTAL SCORE: 0
HOW OFTEN DO YOU HAVE A DRINK CONTAINING ALCOHOL: NEVER

## 2024-11-21 NOTE — PROGRESS NOTES
Ob Follow-up  24     SUBJECTIVE    HPI: Zoraida Martinez is a 29 y.o.  at 32w5d here for RPNV.  She has no contractions, bleeding, or LOF. Reports normal fetal movement. Patient reports mood has been doing well. Working a lot.        OBJECTIVE  Visit Vitals  /62   Wt 90.7 kg (200 lb)   LMP  (LMP Unknown)   BMI 33.28 kg/m²   OB Status Pregnant   Smoking Status Never   BSA 2.04 m²            ASSESSMENT & PLAN    Zoraida Martinez is a 29 y.o.  at 32w5d here for the following concerns we addressed today:    Problem List Items Addressed This Visit       Depression affecting pregnancy in first trimester, antepartum ([facility])    Overview     - previously diagnosed with anxiety, depression, ADHD, PTSD  - on wellbutin 200mg BID and klonipin PRN  - following with Dr. Jenkins         History of IUFD    Overview     - 23 weeks fetal demise, delivered by   - No clear etiology based on chart review, normal microarray and placental pathology, no autopsy performed  - normal lupus anticoagulant, anticardiolipin antibodies and anti-B2 glycoprotein  - plan for serial growth ultrasound after 24 weeks   - plan for  testing at 32 weeks  - plan for delivery no later than 39 weeks. Can consider at 37 weeks for maternal anxiety          Migraine without status migrainosus, not intractable    Overview     Reviewed safe medications in pregnancy         Pulmonary valve stenosis    Overview     S/p MFM Consult  Normal maternal echo and BNP  No need for telemetry in labor or SBE prophylaxis           Rh negative state in antepartum period ([facility])    Overview     Rhogam given 10/31/24         Supervision of high risk pregnancy in third trimester ([facility]) - Primary    Overview     [x] Dating: by 10 week ultrasound (LMP unknown)  [x] Initial BMI: 29  [x] Prenatal Labs: reviewed, Rh negative, Rubella immune, Hgb 13.6  [x] Pap: 2024 ASCUS/+HR HPV, colpo performed and no high grade abnormalities suspected  [x]  Aneuploidy Screening: normal first trimester screen  [x] Baby ASA: not indicated, one moderate risk factor  [x] Anatomy US:  absent nasal bone, declines genetic counseling/testing  [x] 1hr GCT at 24-28wks: normal at 81  [x] Tdap (27-36wks): Given 10/11/24  [x] Flu Shot: Given 10/11/24  [] RSV vaccine: accepts, schedule at [place]  [x] Rhogam (if Rh neg): given 10/31  [] Third trimester growth: plan for serial growth   [] GBS at 36 wks:  [x] Breastfeeding: Plans to breastfeed, has wearable pump  [x] PPBC: Discussed, declines  [x] 39 weeks discussion of IOL vs. Expectant management: plan for delivery ~38 weeks  [x] Mode of delivery: Anticipate             Sent to L&D after appointment for prolonged monitoring variable decels on office NST    Ashlee Cyr MD

## 2024-11-21 NOTE — H&P
OB Triage H&P    Assessment/Plan    Zoraida Martinez is a 29 y.o.  at 32w5d, ALENA: 2025, by Ultrasound, who presents to triage from the office for prolonged monitoring in s/o small variable decelerations on NST today.    Plan      Prolonged Monitoring, IUP @ 32.5 wga  - > 2 hrs category I tracing  - BPP , KELLY 15  - Fetal monitoring reassuring  - Good fetal movement  - Up to date on prenatal care  - Continue routine prenatal care- next appt needs to be scheduled, Dr. Cyr's office messaged to assist in scheduling    Maternal Well-being  - All questions and concerns addressed   - Increased symptoms of anxiety in s/o history of IUFD, continue wellbutrin and klonipin PRN    Dispo  -Patient appropriate for discharge home, agrees with plan  -Return precautions discussed   -Follow up at next scheduled OB appointment or to triage sooner as needed    Discussed plan and reviewed with: Dr. Hanna    Pregnancy Problems (from 24 to present)       Problem Noted Diagnosed Resolved    Supervision of high risk pregnancy in first trimester ([facility]) 2024 by Ashlee Cyr MD  No    Priority:  Medium       Overview Addendum 2024  1:34 PM by Ashlee Cyr MD     [x] Dating: by 10 week ultrasound (LMP unknown)  [] Initial BMI: need height to calculate  [x] Prenatal Labs: reviewed, Rh negative, Rubella immune, Hgb 13.6  [x] Pap: 2024 ASCUS/+HR HPV, colpo performed and no high grade abnormalities suspected  [x] Aneuploidy Screening: normal first trimester screen  [x] Baby ASA: not indicated, one moderate risk factor  [x] Anatomy US:  absent nasal bone, declines genetic counseling/testing  [x] 1hr GCT at 24-28wks: normal at 81  [x] Tdap (27-36wks): Given 10/11/24  [x] Flu Shot: Given 10/11/24  [] RSV vaccine: discussed 10/31 and will consider  [x] Rhogam (if Rh neg): given 10/31  [] Third trimester growth: plan for serial growth   [] GBS at 36 wks:  [] Breastfeeding  [] PPBC:   [] 39 weeks  discussion of IOL vs. Expectant management:  [] Mode of delivery:            Depression affecting pregnancy in first trimester, antepartum ([facility]) 2024 by Ashlee Cyr MD  No    Priority:  Medium       Overview Addendum 10/11/2024  1:48 PM by Ashlee Cyr MD     - previously diagnosed with anxiety, depression, ADHD, PTSD  - on wellbutin 200mg BID and klonipin PRN  - following with Dr. Jenkins         Rh negative state in antepartum period ([facility]) 2024 by Ashlee Cyr MD  No    Priority:  Medium       Overview Addendum 2024  1:33 PM by Ashlee Cyr MD     Rhogam given 10/31/24                 Subjective   Good fetal movement.  Denies vaginal bleeding., C/O of occasional contractions., Denies leaking of fluid.      Prenatal Provider Dr Cyr    OB History    Para Term  AB Living   3 1 0 1 1 0   SAB IAB Ectopic Multiple Live Births   0 1 0 0 0      # Outcome Date GA Lbr Tim/2nd Weight Sex Type Anes PTL Lv   3 Current            2      M Vag-Spont   FD   1 IAB                No past surgical history on file.    Social History     Tobacco Use    Smoking status: Never    Smokeless tobacco: Never   Substance Use Topics    Alcohol use: Not Currently       No Known Allergies    Medications Prior to Admission   Medication Sig Dispense Refill Last Dose/Taking    buPROPion SR (Wellbutrin SR) 200 mg 12 hr tablet Take 1 tablet (200 mg) by mouth 2 times a day. 180 tablet 3 2024 Morning    cetirizine (ZyrTEC) 10 mg tablet Take 1 tablet (10 mg) by mouth once daily. 90 tablet 0 2024 Morning    clonazePAM (KlonoPIN) 0.5 mg tablet Take 1 tablet by mouth daily as needed for anxiety. Qty of 15 for 15 day supply with 1 refill. 15 tablet 1 2024 Evening    prenatal vit,calc76-iron-folic (Prenatabs Rx) 29 mg iron- 1 mg tablet Take 1 tablet by mouth once daily.   2024 Morning    fluticasone (Flonase) 50 mcg/actuation nasal spray Administer 1  spray into each nostril once daily. Shake gently. Before first use, prime pump. After use, clean tip and replace cap. 16 g 5      Objective     Last Vitals  Temp Pulse Resp BP MAP O2 Sat   36.7 °C (98.1 °F) 82 18 118/74 90 98 %     Blood Pressures         11/21/2024  1656 11/21/2024  1657          BP: 118/74 118/74               Physical Exam  General: NAD, mood appropriate  Cardiopulmonary: warm and well perfused, breathing comfortably on room air  Abdomen: Gravid, non-tender  Extremities: Symmetric  Speculum Exam: deferred  Cervix: Closed /30 /-3      Fetal Monitoring  Baseline: 140 bpm, Variability: moderate,  Accelerations: present and Decelerations: small variable decelerations, intermittent  Uterine Activity: Irregular contractions  Interpretation: Category I and Reactive    Bedside ultrasound: Yes, BPP 8/8, KELLY 15, cephalic presentation    Admission on 11/21/2024   Component Date Value Ref Range Status    POC Color, Urine 11/21/2024 Yellow  Straw, Yellow, Light-Yellow Final    POC Appearance, Urine 11/21/2024 Clear  Clear Final    POC Glucose, Urine 11/21/2024 NEGATIVE  NEGATIVE mg/dl Final    POC Bilirubin, Urine 11/21/2024 NEGATIVE  NEGATIVE Final    POC Ketones, Urine 11/21/2024 NEGATIVE  NEGATIVE mg/dl Final    POC Specific Gravity, Urine 11/21/2024 1.020  1.005 - 1.035 Final    POC Blood, Urine 11/21/2024 NEGATIVE  NEGATIVE Final    POC PH, Urine 11/21/2024 8.5  No Reference Range Established PH Final    POC Protein, Urine 11/21/2024 TRACE (A)  NEGATIVE, 30 (1+) mg/dl Final    POC Urobilinogen, Urine 11/21/2024 0.2  0.2, 1.0 EU/DL Final    Poc Nitrite, Urine 11/21/2024 NEGATIVE  NEGATIVE Final    POC Leukocytes, Urine 11/21/2024 TRACE (A)  NEGATIVE Final

## 2024-11-22 ENCOUNTER — TELEPHONE (OUTPATIENT)
Dept: OBSTETRICS AND GYNECOLOGY | Facility: CLINIC | Age: 29
End: 2024-11-22
Payer: MEDICARE

## 2024-11-22 NOTE — PROCEDURES
Zoraida Martinez, a  at 32w5d with an ALENA of 2025, by Ultrasound, was seen at [facility] for a nonstress test.    Non-Stress Test   Baseline Fetal Heart Rate for Non-Stress Test: 140 BPM  Variability in Waveform for Non-Stress Test: Moderate  Accelerations in Non-Stress Test: Yes  Decelerations in Non-Stress Test: Variable  Contractions in Non-Stress Test: Irregular  Interpretation of Non-Stress Test   Interpretation of Non-Stress Test: Reactive      Sent to L&D for further monitoring for multiple variable decels on office NST

## 2024-11-22 NOTE — TELEPHONE ENCOUNTER
Called patient. No answer. Left voicemail to return call   ----- Message from Avelina Morris sent at 11/21/2024  6:57 PM EST -----  Regarding: Prenatal Appt  Hi,  Dr. Cyr sent this patient in for prolonged monitoring and I see she doesn't have another PNV scheduled. Can you schedule her an appointment for next week, please? She should have an NST at that appt for her history of IUFD as well.  Thank you,  Avelina

## 2024-11-23 DIAGNOSIS — F41.9 ANXIETY: ICD-10-CM

## 2024-11-25 ENCOUNTER — TELEPHONE (OUTPATIENT)
Dept: BEHAVIORAL HEALTH | Facility: CLINIC | Age: 29
End: 2024-11-25
Payer: MEDICARE

## 2024-11-25 RX ORDER — CLONAZEPAM 0.5 MG/1
TABLET ORAL
Qty: 15 TABLET | Refills: 0 | Status: SHIPPED | OUTPATIENT
Start: 2024-11-25

## 2024-11-26 ENCOUNTER — APPOINTMENT (OUTPATIENT)
Dept: OBSTETRICS AND GYNECOLOGY | Facility: CLINIC | Age: 29
End: 2024-11-26
Payer: MEDICARE

## 2024-11-26 VITALS — WEIGHT: 199 LBS | DIASTOLIC BLOOD PRESSURE: 78 MMHG | BODY MASS INDEX: 30.26 KG/M2 | SYSTOLIC BLOOD PRESSURE: 120 MMHG

## 2024-11-26 DIAGNOSIS — Z3A.33 33 WEEKS GESTATION OF PREGNANCY (HHS-HCC): Primary | ICD-10-CM

## 2024-11-26 DIAGNOSIS — Z87.59 HISTORY OF IUFD: ICD-10-CM

## 2024-11-26 PROCEDURE — 59025 FETAL NON-STRESS TEST: CPT | Performed by: OBSTETRICS & GYNECOLOGY

## 2024-11-26 PROCEDURE — 0501F PRENATAL FLOW SHEET: CPT | Performed by: OBSTETRICS & GYNECOLOGY

## 2024-11-26 NOTE — PROGRESS NOTES
SUBJECTIVE  Zoraida Martinez is a 29 y.o.  at 33w3d with an estimated date of delivery of 2025, by Ultrasound who presents for a routine prenatal visit.    She denies loss of fluid, vaginal bleeding, regular contractions/cramping, and decreased fetal movement.     OBJECTIVE  Vitals:    24 1440   BP: 120/78   Weight: 90.3 kg (199 lb)          ASSESSMENT & PLAN    33 weeks gestation of pregnancy ([facility])  - Up to date on care, discussed scheduling RSV    History of IUFD  - NST reactive a reassuring today  - BSUS performed showing normal fluid, active fetus    Follow up in 1 week for return OB visit.    Janice Berry MD  Obstetrics & Gynecology  24

## 2024-11-26 NOTE — PROCEDURES
Zoraida Martinez, a  at 33w3d with an ALENA of 2025, by Ultrasound, was seen at [facility] for a nonstress test.    Non-Stress Test   Baseline Fetal Heart Rate for Non-Stress Test: 140 BPM  Variability in Waveform for Non-Stress Test: Moderate  Accelerations in Non-Stress Test: Yes, greater than/equal to 15 bpm, lasting at least 15 seconds  Decelerations in Non-Stress Test: None  Contractions in Non-Stress Test: Not present  Interpretation of Non-Stress Test   Interpretation of Non-Stress Test: Reactive         Janice Berry MD

## 2024-12-03 ENCOUNTER — APPOINTMENT (OUTPATIENT)
Dept: OBSTETRICS AND GYNECOLOGY | Facility: CLINIC | Age: 29
End: 2024-12-03
Payer: MEDICARE

## 2024-12-05 ENCOUNTER — CLINICAL SUPPORT (OUTPATIENT)
Dept: OBSTETRICS AND GYNECOLOGY | Facility: CLINIC | Age: 29
End: 2024-12-05
Payer: MEDICARE

## 2024-12-05 ENCOUNTER — HOSPITAL ENCOUNTER (OUTPATIENT)
Dept: RADIOLOGY | Facility: CLINIC | Age: 29
Discharge: HOME | End: 2024-12-05
Payer: MEDICARE

## 2024-12-05 DIAGNOSIS — Z29.11 NEED FOR RSV IMMUNIZATION: Primary | ICD-10-CM

## 2024-12-05 DIAGNOSIS — O09.91 SUPERVISION OF HIGH RISK PREGNANCY IN FIRST TRIMESTER (HHS-HCC): ICD-10-CM

## 2024-12-05 DIAGNOSIS — O36.5930 MATERNAL CARE FOR OTHER KNOWN OR SUSPECTED POOR FETAL GROWTH, THIRD TRIMESTER, NOT APPLICABLE OR UNSPECIFIED: ICD-10-CM

## 2024-12-05 PROCEDURE — 90678 RSV VACC PREF BIVALENT IM: CPT | Performed by: OBSTETRICS & GYNECOLOGY

## 2024-12-05 PROCEDURE — 76816 OB US FOLLOW-UP PER FETUS: CPT

## 2024-12-05 PROCEDURE — 76819 FETAL BIOPHYS PROFIL W/O NST: CPT

## 2024-12-06 ENCOUNTER — HOSPITAL ENCOUNTER (OUTPATIENT)
Facility: HOSPITAL | Age: 29
Discharge: HOME | End: 2024-12-06
Attending: OBSTETRICS & GYNECOLOGY | Admitting: OBSTETRICS & GYNECOLOGY
Payer: MEDICARE

## 2024-12-06 ENCOUNTER — ROUTINE PRENATAL (OUTPATIENT)
Dept: OBSTETRICS AND GYNECOLOGY | Facility: CLINIC | Age: 29
End: 2024-12-06
Payer: MEDICARE

## 2024-12-06 VITALS — SYSTOLIC BLOOD PRESSURE: 129 MMHG | TEMPERATURE: 97.5 F | DIASTOLIC BLOOD PRESSURE: 72 MMHG | HEART RATE: 96 BPM

## 2024-12-06 VITALS — SYSTOLIC BLOOD PRESSURE: 112 MMHG | BODY MASS INDEX: 30.41 KG/M2 | DIASTOLIC BLOOD PRESSURE: 70 MMHG | WEIGHT: 200 LBS

## 2024-12-06 DIAGNOSIS — Z87.59 HISTORY OF IUFD: ICD-10-CM

## 2024-12-06 DIAGNOSIS — G43.909 MIGRAINE WITHOUT STATUS MIGRAINOSUS, NOT INTRACTABLE, UNSPECIFIED MIGRAINE TYPE: ICD-10-CM

## 2024-12-06 DIAGNOSIS — Z67.91 RH NEGATIVE STATE IN ANTEPARTUM PERIOD (HHS-HCC): ICD-10-CM

## 2024-12-06 DIAGNOSIS — O99.341 DEPRESSION AFFECTING PREGNANCY IN FIRST TRIMESTER, ANTEPARTUM (HHS-HCC): ICD-10-CM

## 2024-12-06 DIAGNOSIS — I37.0 PULMONARY VALVE STENOSIS, UNSPECIFIED ETIOLOGY: ICD-10-CM

## 2024-12-06 DIAGNOSIS — Z3A.35 35 WEEKS GESTATION OF PREGNANCY (HHS-HCC): ICD-10-CM

## 2024-12-06 DIAGNOSIS — F32.A DEPRESSION AFFECTING PREGNANCY IN FIRST TRIMESTER, ANTEPARTUM (HHS-HCC): ICD-10-CM

## 2024-12-06 DIAGNOSIS — O26.899 RH NEGATIVE STATE IN ANTEPARTUM PERIOD (HHS-HCC): ICD-10-CM

## 2024-12-06 DIAGNOSIS — F41.9 ANXIETY: ICD-10-CM

## 2024-12-06 DIAGNOSIS — O09.93 SUPERVISION OF HIGH RISK PREGNANCY IN THIRD TRIMESTER (HHS-HCC): Primary | ICD-10-CM

## 2024-12-06 PROCEDURE — 59025 FETAL NON-STRESS TEST: CPT

## 2024-12-06 PROCEDURE — 99213 OFFICE O/P EST LOW 20 MIN: CPT

## 2024-12-06 PROCEDURE — 99214 OFFICE O/P EST MOD 30 MIN: CPT | Mod: 25

## 2024-12-06 PROCEDURE — 2500000001 HC RX 250 WO HCPCS SELF ADMINISTERED DRUGS (ALT 637 FOR MEDICARE OP)

## 2024-12-06 RX ORDER — CLONAZEPAM 0.5 MG/1
0.5 TABLET ORAL ONCE
Status: COMPLETED | OUTPATIENT
Start: 2024-12-06 | End: 2024-12-06

## 2024-12-06 RX ORDER — ONDANSETRON 4 MG/1
4 TABLET, FILM COATED ORAL EVERY 6 HOURS PRN
Status: DISCONTINUED | OUTPATIENT
Start: 2024-12-06 | End: 2024-12-06 | Stop reason: HOSPADM

## 2024-12-06 RX ORDER — ONDANSETRON HYDROCHLORIDE 2 MG/ML
4 INJECTION, SOLUTION INTRAVENOUS EVERY 6 HOURS PRN
Status: DISCONTINUED | OUTPATIENT
Start: 2024-12-06 | End: 2024-12-06 | Stop reason: HOSPADM

## 2024-12-06 SDOH — HEALTH STABILITY: MENTAL HEALTH: HAVE YOU USED ANY SUBSTANCES (CANABIS, COCAINE, HEROIN, HALLUCINOGENS, INHALANTS, ETC.) IN THE PAST 12 MONTHS?: NO

## 2024-12-06 SDOH — SOCIAL STABILITY: SOCIAL INSECURITY: PHYSICAL ABUSE: DENIES

## 2024-12-06 SDOH — HEALTH STABILITY: MENTAL HEALTH: NON-SPECIFIC ACTIVE SUICIDAL THOUGHTS (PAST 1 MONTH): NO

## 2024-12-06 SDOH — HEALTH STABILITY: MENTAL HEALTH: STRENGTHS (MUST CHOOSE TWO): SUPPORT FROM FRIENDS;SUPPORT FROM FAMILY;INDEPENDENT LIVING

## 2024-12-06 SDOH — HEALTH STABILITY: MENTAL HEALTH: HAVE YOU USED ANY PRESCRIPTION DRUGS OTHER THAN PRESCRIBED IN THE PAST 12 MONTHS?: NO

## 2024-12-06 SDOH — ECONOMIC STABILITY: HOUSING INSECURITY: DO YOU FEEL UNSAFE GOING BACK TO THE PLACE WHERE YOU ARE LIVING?: NO

## 2024-12-06 SDOH — SOCIAL STABILITY: SOCIAL INSECURITY: HAVE YOU HAD ANY THOUGHTS OF HARMING ANYONE ELSE?: NO

## 2024-12-06 SDOH — SOCIAL STABILITY: SOCIAL INSECURITY: ARE THERE ANY APPARENT SIGNS OF INJURIES/BEHAVIORS THAT COULD BE RELATED TO ABUSE/NEGLECT?: NO

## 2024-12-06 SDOH — HEALTH STABILITY: MENTAL HEALTH: WERE YOU ABLE TO COMPLETE ALL THE BEHAVIORAL HEALTH SCREENINGS?: YES

## 2024-12-06 SDOH — SOCIAL STABILITY: SOCIAL INSECURITY: VERBAL ABUSE: DENIES

## 2024-12-06 SDOH — SOCIAL STABILITY: SOCIAL INSECURITY: HAVE YOU HAD THOUGHTS OF HARMING ANYONE ELSE?: NO

## 2024-12-06 SDOH — SOCIAL STABILITY: SOCIAL INSECURITY: ABUSE SCREEN: ADULT

## 2024-12-06 SDOH — HEALTH STABILITY: MENTAL HEALTH: WISH TO BE DEAD (PAST 1 MONTH): NO

## 2024-12-06 SDOH — SOCIAL STABILITY: SOCIAL INSECURITY: DOES ANYONE TRY TO KEEP YOU FROM HAVING/CONTACTING OTHER FRIENDS OR DOING THINGS OUTSIDE YOUR HOME?: NO

## 2024-12-06 SDOH — SOCIAL STABILITY: SOCIAL INSECURITY: ARE YOU OR HAVE YOU BEEN THREATENED OR ABUSED PHYSICALLY, EMOTIONALLY, OR SEXUALLY BY ANYONE?: NO

## 2024-12-06 SDOH — HEALTH STABILITY: MENTAL HEALTH: SUICIDAL BEHAVIOR (LIFETIME): NO

## 2024-12-06 SDOH — SOCIAL STABILITY: SOCIAL INSECURITY: HAS ANYONE EVER THREATENED TO HURT YOUR FAMILY OR YOUR PETS?: NO

## 2024-12-06 SDOH — SOCIAL STABILITY: SOCIAL INSECURITY: DO YOU FEEL ANYONE HAS EXPLOITED OR TAKEN ADVANTAGE OF YOU FINANCIALLY OR OF YOUR PERSONAL PROPERTY?: NO

## 2024-12-06 ASSESSMENT — LIFESTYLE VARIABLES
HOW OFTEN DO YOU HAVE A DRINK CONTAINING ALCOHOL: NEVER
AUDIT-C TOTAL SCORE: 0
AUDIT-C TOTAL SCORE: 0
SKIP TO QUESTIONS 9-10: 1
HOW MANY STANDARD DRINKS CONTAINING ALCOHOL DO YOU HAVE ON A TYPICAL DAY: PATIENT DOES NOT DRINK
HOW OFTEN DO YOU HAVE 6 OR MORE DRINKS ON ONE OCCASION: NEVER

## 2024-12-06 ASSESSMENT — PATIENT HEALTH QUESTIONNAIRE - PHQ9
2. FEELING DOWN, DEPRESSED OR HOPELESS: NOT AT ALL
1. LITTLE INTEREST OR PLEASURE IN DOING THINGS: NOT AT ALL
SUM OF ALL RESPONSES TO PHQ9 QUESTIONS 1 & 2: 0

## 2024-12-06 ASSESSMENT — PAIN SCALES - GENERAL
PAINLEVEL_OUTOF10: 0 - NO PAIN

## 2024-12-06 NOTE — PROCEDURES
Zoraida Martinez, a  at 34w6d with an ALENA of 2025, by Ultrasound, was seen at [facility] for a nonstress test.    Non-Stress Test   Baseline Fetal Heart Rate for Non-Stress Test: 150 BPM  Variability in Waveform for Non-Stress Test: Moderate  Accelerations in Non-Stress Test: Yes  Decelerations in Non-Stress Test: Variable (multiple small variable decels)  Contractions in Non-Stress Test: Irregular  Interpretation of Non-Stress Test   Interpretation of Non-Stress Test: Reactive      Sent to L&D for further monitoring for multiple variable decels

## 2024-12-06 NOTE — PROGRESS NOTES
Ob Follow-up  24     SUBJECTIVE    HPI: Zoraida Martinez is a 29 y.o.  at 34w6d here for RPNV.  She has irregular contractions, no bleeding, or LOF. Reports normal fetal movement. Patient reports she is exhausted from work.        OBJECTIVE  Visit Vitals  /70   Wt 90.7 kg (200 lb)   LMP  (LMP Unknown)   BMI 30.41 kg/m²   OB Status Pregnant   Smoking Status Never   BSA 2.09 m²            ASSESSMENT & PLAN    Zoraida Martinez is a 29 y.o.  at 34w6d here for the following concerns we addressed today:    Problem List Items Addressed This Visit       35 weeks gestation of pregnancy ([facility])    Overview     [x] Dating: by 10 week ultrasound (LMP unknown)  [x] Initial BMI: 29  [x] Prenatal Labs: reviewed, Rh negative, Rubella immune, Hgb 13.6  [x] Pap: 2024 ASCUS/+HR HPV, colpo performed and no high grade abnormalities suspected  [x] Aneuploidy Screening: normal first trimester screen  [x] Baby ASA: not indicated, one moderate risk factor  [x] Anatomy US:  absent nasal bone, declines genetic counseling/testing  [x] 1hr GCT at 24-28wks: normal at 81  [x] Tdap (27-36wks): Given 10/11/24  [x] Flu Shot: Given 10/11/24  [x] RSV vaccine: Done  [x] Rhogam (if Rh neg): given 10/31  [x] Third trimester growth: serial growth for history of IUFD, EFW at 34 weeks 37%tile  [] GBS at 36 wks:  [x] Breastfeeding: Plans to breastfeed, has wearable pump  [x] PPBC: Discussed, declines  [x] 39 weeks discussion of IOL vs. Expectant management: plan for delivery ~38 weeks, request evening of   [x] Mode of delivery: Anticipate            Depression affecting pregnancy in first trimester, antepartum ([facility])    Overview     - previously diagnosed with anxiety, depression, ADHD, PTSD  - on wellbutin 200mg BID and klonipin PRN  - following with Dr. Jenkins         History of IUFD    Overview     - 23 weeks fetal demise, delivered by   - No clear etiology based on chart review, normal microarray and placental  pathology, no autopsy performed  - normal lupus anticoagulant, anticardiolipin antibodies and anti-B2 glycoprotein  - plan for serial growth ultrasound after 24 weeks   - plan for  testing at 32 weeks  - plan for delivery no later than 39 weeks. Can consider at 37 weeks for maternal anxiety          Migraine without status migrainosus, not intractable    Overview     Reviewed safe medications in pregnancy         Pulmonary valve stenosis    Overview     S/p MFM Consult  Normal maternal echo and BNP  No need for telemetry in labor or SBE prophylaxis           Rh negative state in antepartum period ([facility])    Overview     Rhogam given 10/31/24         Supervision of high risk pregnancy in third trimester ([facility]) - Primary     Sent to L&D for further monitoring for multiple small variables on NST  RTC in 1 week    Ashlee Cyr MD

## 2024-12-07 NOTE — H&P
Triage H&P    Zoraida Martinez is a 29 y.o. year old at 34w6d who presents to triage for   Chief Complaint   Patient presents with    non reactive NST     Decels in office        Assessment/Plan:    Fetal Well-Being  - Decels in office on NST  - NST reactive in triage  - Cat I, no decels on >2hrs of monitoring  - Good fetal movement    IUP at 34w6d   - NST reactive  - Good fetal movement  - Continue routine prenatal care  - Next appt   - Precautions to return discussed     Maternal Well-being  - Vital signs stable and WNL  - All questions and concerns addressed     Plan and tracing reviewed with Dr. Islas .  Patient safe and stable for d/c home.    Karen Lr, APRN-CNP      Medical Problems       Problem List       35 weeks gestation of pregnancy ([facility])    Overview Addendum 2024  5:03 PM by Ashlee Cyr MD     [x] Dating: by 10 week ultrasound (LMP unknown)  [x] Initial BMI: 29  [x] Prenatal Labs: reviewed, Rh negative, Rubella immune, Hgb 13.6  [x] Pap: 2024 ASCUS/+HR HPV, colpo performed and no high grade abnormalities suspected  [x] Aneuploidy Screening: normal first trimester screen  [x] Baby ASA: not indicated, one moderate risk factor  [x] Anatomy US:  absent nasal bone, declines genetic counseling/testing  [x] 1hr GCT at 24-28wks: normal at 81  [x] Tdap (27-36wks): Given 10/11/24  [x] Flu Shot: Given 10/11/24  [x] RSV vaccine: Done  [x] Rhogam (if Rh neg): given 10/31  [x] Third trimester growth: serial growth for history of IUFD, EFW at 34 weeks 37%tile  [] GBS at 36 wks:  [x] Breastfeeding: Plans to breastfeed, has wearable pump  [x] PPBC: Discussed, declines  [x] 39 weeks discussion of IOL vs. Expectant management: plan for delivery ~38 weeks, request evening of   [x] Mode of delivery: Anticipate            Depression affecting pregnancy in first trimester, antepartum ([facility])    Overview Addendum 10/11/2024  1:48 PM by Ashlee Cyr MD     - previously diagnosed  with anxiety, depression, ADHD, PTSD  - on wellbutin 200mg BID and klonipin PRN  - following with Dr. Jenkins         History of IUFD    Overview Addendum 2024  8:33 PM by Ashlee Cyr MD     - 23 weeks fetal demise, delivered by   - No clear etiology based on chart review, normal microarray and placental pathology, no autopsy performed  - normal lupus anticoagulant, anticardiolipin antibodies and anti-B2 glycoprotein  - plan for serial growth ultrasound after 24 weeks   - plan for  testing at 32 weeks  - plan for delivery no later than 39 weeks. Can consider at 37 weeks for maternal anxiety          Rh negative state in antepartum period ([facility])    Overview Addendum 2024  1:33 PM by Ashlee Cyr MD     Rhogam given 10/31/24         Pulmonary valve stenosis    Overview Addendum 10/11/2024  1:47 PM by Ashlee Cyr MD     S/p MFM Consult  Normal maternal echo and BNP  No need for telemetry in labor or SBE prophylaxis           Migraine without status migrainosus, not intractable    Overview Signed 2024  9:33 PM by Ashlee Cyr MD     Reviewed safe medications in pregnancy         Supervision of high risk pregnancy in third trimester ([facility])           Subjective   Zoraida Martinez is a 29 y.o. year old at 34w6d who presents to triage from the office for decels on NST.  Denies VB, LOF, ctx, and reports good fetal movement.     OB History          3    Para   1    Term           1    AB   1    Living             SAB   0    IAB   1    Ectopic        Multiple        Live Births                      No past surgical history on file.     Social History     Socioeconomic History    Marital status: Single     Spouse name: Not on file    Number of children: Not on file    Years of education: Not on file    Highest education level: Not on file   Occupational History    Not on file   Tobacco Use    Smoking status: Never    Smokeless tobacco: Never    Vaping Use    Vaping status: Never Used   Substance and Sexual Activity    Alcohol use: Not Currently    Drug use: Not Currently     Types: Marijuana    Sexual activity: Not on file   Other Topics Concern    Not on file   Social History Narrative    Not on file     Social Drivers of Health     Financial Resource Strain: Not on file   Food Insecurity: Not on file   Transportation Needs: Not on file   Physical Activity: Not on file   Stress: Not on file   Social Connections: Not on file   Intimate Partner Violence: Not on file        No Known Allergies     Medications Prior to Admission   Medication Sig Dispense Refill Last Dose/Taking    buPROPion SR (Wellbutrin SR) 200 mg 12 hr tablet Take 1 tablet (200 mg) by mouth 2 times a day. 180 tablet 3 12/6/2024 Morning    cetirizine (ZyrTEC) 10 mg tablet Take 1 tablet (10 mg) by mouth once daily. 90 tablet 0 12/6/2024 Morning    clonazePAM (KlonoPIN) 0.5 mg tablet TAKE 1 TABLET BY MOUTH ONCE DAILY AS NEEDED FOR ANXIETY 15 tablet 0 12/5/2024 Evening    prenatal vit,calc76-iron-folic (Prenatabs Rx) 29 mg iron- 1 mg tablet Take 1 tablet by mouth once daily.   12/6/2024 Morning    fluticasone (Flonase) 50 mcg/actuation nasal spray Administer 1 spray into each nostril once daily. Shake gently. Before first use, prime pump. After use, clean tip and replace cap. (Patient not taking: Reported on 12/6/2024) 16 g 5 More than a month        Objective     Visit Vitals  /72   Pulse 96   Temp 36.4 °C (97.5 °F)        Physical Exam  Physical Exam  Exam conducted with a chaperone present.   Constitutional:       Appearance: Normal appearance.   HENT:      Head: Normocephalic.   Cardiovascular:      Rate and Rhythm: Normal rate.   Pulmonary:      Effort: Pulmonary effort is normal.   Abdominal:      Comments: Gravid   Musculoskeletal:         General: Normal range of motion.   Skin:     General: Skin is warm.   Neurological:      Mental Status: She is alert and oriented to person,  place, and time.   Psychiatric:         Mood and Affect: Mood normal.         Behavior: Behavior normal.          NST  140, mod variability, +accels, -decels   2 15x15 accelerations  Reactive    Labs  Labs in chart were reviewed.

## 2024-12-08 ENCOUNTER — TELEPHONE (OUTPATIENT)
Dept: OBSTETRICS AND GYNECOLOGY | Facility: CLINIC | Age: 29
End: 2024-12-08

## 2024-12-08 ENCOUNTER — HOSPITAL ENCOUNTER (OUTPATIENT)
Facility: HOSPITAL | Age: 29
End: 2024-12-08
Attending: STUDENT IN AN ORGANIZED HEALTH CARE EDUCATION/TRAINING PROGRAM | Admitting: STUDENT IN AN ORGANIZED HEALTH CARE EDUCATION/TRAINING PROGRAM
Payer: MEDICARE

## 2024-12-08 ENCOUNTER — HOSPITAL ENCOUNTER (OUTPATIENT)
Facility: HOSPITAL | Age: 29
Discharge: HOME | End: 2024-12-08
Attending: STUDENT IN AN ORGANIZED HEALTH CARE EDUCATION/TRAINING PROGRAM | Admitting: STUDENT IN AN ORGANIZED HEALTH CARE EDUCATION/TRAINING PROGRAM
Payer: MEDICARE

## 2024-12-08 VITALS
DIASTOLIC BLOOD PRESSURE: 71 MMHG | BODY MASS INDEX: 30.97 KG/M2 | OXYGEN SATURATION: 100 % | HEIGHT: 68 IN | TEMPERATURE: 97.5 F | RESPIRATION RATE: 18 BRPM | HEART RATE: 85 BPM | SYSTOLIC BLOOD PRESSURE: 120 MMHG | WEIGHT: 204.37 LBS

## 2024-12-08 LAB
POC APPEARANCE, URINE: CLEAR
POC BILIRUBIN, URINE: NEGATIVE
POC BLOOD, URINE: NEGATIVE
POC COLOR, URINE: YELLOW
POC GLUCOSE, URINE: NEGATIVE MG/DL
POC KETONES, URINE: ABNORMAL MG/DL
POC LEUKOCYTES, URINE: ABNORMAL
POC NITRITE,URINE: NEGATIVE
POC PH, URINE: 7 PH
POC PROTEIN, URINE: NEGATIVE MG/DL
POC SPECIFIC GRAVITY, URINE: 1.02
POC UROBILINOGEN, URINE: 0.2 EU/DL

## 2024-12-08 PROCEDURE — 87081 CULTURE SCREEN ONLY: CPT

## 2024-12-08 PROCEDURE — 99215 OFFICE O/P EST HI 40 MIN: CPT

## 2024-12-08 PROCEDURE — 2500000001 HC RX 250 WO HCPCS SELF ADMINISTERED DRUGS (ALT 637 FOR MEDICARE OP)

## 2024-12-08 PROCEDURE — 59025 FETAL NON-STRESS TEST: CPT

## 2024-12-08 PROCEDURE — 59025 FETAL NON-STRESS TEST: CPT | Mod: GC

## 2024-12-08 PROCEDURE — 87077 CULTURE AEROBIC IDENTIFY: CPT

## 2024-12-08 RX ORDER — NIFEDIPINE 10 MG/1
10 CAPSULE ORAL ONCE AS NEEDED
Status: DISCONTINUED | OUTPATIENT
Start: 2024-12-08 | End: 2024-12-08 | Stop reason: HOSPADM

## 2024-12-08 RX ORDER — ONDANSETRON 4 MG/1
4 TABLET, FILM COATED ORAL EVERY 6 HOURS PRN
Status: DISCONTINUED | OUTPATIENT
Start: 2024-12-08 | End: 2024-12-08 | Stop reason: HOSPADM

## 2024-12-08 RX ORDER — BUPROPION HYDROCHLORIDE 100 MG/1
200 TABLET, EXTENDED RELEASE ORAL ONCE
Status: DISCONTINUED | OUTPATIENT
Start: 2024-12-08 | End: 2024-12-08

## 2024-12-08 RX ORDER — LORAZEPAM 0.5 MG/1
0.5 TABLET ORAL ONCE
Status: COMPLETED | OUTPATIENT
Start: 2024-12-08 | End: 2024-12-08

## 2024-12-08 RX ORDER — CYCLOBENZAPRINE HCL 10 MG
10 TABLET ORAL ONCE
Status: DISCONTINUED | OUTPATIENT
Start: 2024-12-08 | End: 2024-12-08

## 2024-12-08 RX ORDER — ACETAMINOPHEN 325 MG/1
975 TABLET ORAL ONCE
Status: COMPLETED | OUTPATIENT
Start: 2024-12-08 | End: 2024-12-08

## 2024-12-08 RX ORDER — LIDOCAINE HYDROCHLORIDE 10 MG/ML
0.5 INJECTION, SOLUTION INFILTRATION; PERINEURAL ONCE AS NEEDED
Status: DISCONTINUED | OUTPATIENT
Start: 2024-12-08 | End: 2024-12-08 | Stop reason: HOSPADM

## 2024-12-08 RX ORDER — LABETALOL HYDROCHLORIDE 5 MG/ML
20 INJECTION, SOLUTION INTRAVENOUS ONCE AS NEEDED
Status: DISCONTINUED | OUTPATIENT
Start: 2024-12-08 | End: 2024-12-08 | Stop reason: HOSPADM

## 2024-12-08 RX ORDER — ONDANSETRON HYDROCHLORIDE 2 MG/ML
4 INJECTION, SOLUTION INTRAVENOUS EVERY 6 HOURS PRN
Status: DISCONTINUED | OUTPATIENT
Start: 2024-12-08 | End: 2024-12-08 | Stop reason: HOSPADM

## 2024-12-08 RX ORDER — HYDRALAZINE HYDROCHLORIDE 20 MG/ML
5 INJECTION INTRAMUSCULAR; INTRAVENOUS ONCE AS NEEDED
Status: DISCONTINUED | OUTPATIENT
Start: 2024-12-08 | End: 2024-12-08 | Stop reason: HOSPADM

## 2024-12-08 RX ORDER — CLONAZEPAM 0.5 MG/1
0.5 TABLET ORAL ONCE
Status: COMPLETED | OUTPATIENT
Start: 2024-12-08 | End: 2024-12-08

## 2024-12-08 RX ORDER — BUPROPION HYDROCHLORIDE 100 MG/1
200 TABLET, EXTENDED RELEASE ORAL ONCE
Status: DISCONTINUED | OUTPATIENT
Start: 2024-12-08 | End: 2024-12-08 | Stop reason: HOSPADM

## 2024-12-08 RX ADMIN — LORAZEPAM 0.5 MG: 0.5 TABLET ORAL at 09:05

## 2024-12-08 RX ADMIN — CLONAZEPAM 0.5 MG: 0.5 TABLET ORAL at 03:32

## 2024-12-08 RX ADMIN — ACETAMINOPHEN 975 MG: 325 TABLET ORAL at 09:05

## 2024-12-08 SDOH — SOCIAL STABILITY: SOCIAL INSECURITY: DOES ANYONE TRY TO KEEP YOU FROM HAVING/CONTACTING OTHER FRIENDS OR DOING THINGS OUTSIDE YOUR HOME?: NO

## 2024-12-08 SDOH — SOCIAL STABILITY: SOCIAL INSECURITY: DO YOU FEEL ANYONE HAS EXPLOITED OR TAKEN ADVANTAGE OF YOU FINANCIALLY OR OF YOUR PERSONAL PROPERTY?: NO

## 2024-12-08 SDOH — HEALTH STABILITY: MENTAL HEALTH: WERE YOU ABLE TO COMPLETE ALL THE BEHAVIORAL HEALTH SCREENINGS?: YES

## 2024-12-08 SDOH — HEALTH STABILITY: MENTAL HEALTH: WISH TO BE DEAD (PAST 1 MONTH): NO

## 2024-12-08 SDOH — ECONOMIC STABILITY: HOUSING INSECURITY: DO YOU FEEL UNSAFE GOING BACK TO THE PLACE WHERE YOU ARE LIVING?: NO

## 2024-12-08 SDOH — SOCIAL STABILITY: SOCIAL INSECURITY: PHYSICAL ABUSE: DENIES

## 2024-12-08 SDOH — HEALTH STABILITY: MENTAL HEALTH: SUICIDAL BEHAVIOR (LIFETIME): NO

## 2024-12-08 SDOH — SOCIAL STABILITY: SOCIAL INSECURITY: HAVE YOU HAD ANY THOUGHTS OF HARMING ANYONE ELSE?: NO

## 2024-12-08 SDOH — HEALTH STABILITY: MENTAL HEALTH: HAVE YOU USED ANY PRESCRIPTION DRUGS OTHER THAN PRESCRIBED IN THE PAST 12 MONTHS?: NO

## 2024-12-08 SDOH — SOCIAL STABILITY: SOCIAL INSECURITY: HAVE YOU HAD THOUGHTS OF HARMING ANYONE ELSE?: NO

## 2024-12-08 SDOH — SOCIAL STABILITY: SOCIAL INSECURITY: ABUSE SCREEN: ADULT

## 2024-12-08 SDOH — SOCIAL STABILITY: SOCIAL INSECURITY: VERBAL ABUSE: DENIES

## 2024-12-08 SDOH — SOCIAL STABILITY: SOCIAL INSECURITY: ARE YOU OR HAVE YOU BEEN THREATENED OR ABUSED PHYSICALLY, EMOTIONALLY, OR SEXUALLY BY ANYONE?: NO

## 2024-12-08 SDOH — HEALTH STABILITY: MENTAL HEALTH: HAVE YOU USED ANY SUBSTANCES (CANABIS, COCAINE, HEROIN, HALLUCINOGENS, INHALANTS, ETC.) IN THE PAST 12 MONTHS?: NO

## 2024-12-08 SDOH — SOCIAL STABILITY: SOCIAL INSECURITY: ARE THERE ANY APPARENT SIGNS OF INJURIES/BEHAVIORS THAT COULD BE RELATED TO ABUSE/NEGLECT?: NO

## 2024-12-08 SDOH — HEALTH STABILITY: MENTAL HEALTH: NON-SPECIFIC ACTIVE SUICIDAL THOUGHTS (PAST 1 MONTH): NO

## 2024-12-08 SDOH — SOCIAL STABILITY: SOCIAL INSECURITY: HAS ANYONE EVER THREATENED TO HURT YOUR FAMILY OR YOUR PETS?: NO

## 2024-12-08 ASSESSMENT — PAIN SCALES - GENERAL
PAINLEVEL_OUTOF10: 0 - NO PAIN
PAINLEVEL_OUTOF10: 2

## 2024-12-08 ASSESSMENT — LIFESTYLE VARIABLES
HOW OFTEN DO YOU HAVE A DRINK CONTAINING ALCOHOL: NEVER
AUDIT-C TOTAL SCORE: 0
HOW MANY STANDARD DRINKS CONTAINING ALCOHOL DO YOU HAVE ON A TYPICAL DAY: PATIENT DOES NOT DRINK
HOW OFTEN DO YOU HAVE 6 OR MORE DRINKS ON ONE OCCASION: NEVER
AUDIT-C TOTAL SCORE: 0
SKIP TO QUESTIONS 9-10: 1

## 2024-12-08 ASSESSMENT — PATIENT HEALTH QUESTIONNAIRE - PHQ9
SUM OF ALL RESPONSES TO PHQ9 QUESTIONS 1 & 2: 0
1. LITTLE INTEREST OR PLEASURE IN DOING THINGS: NOT AT ALL
2. FEELING DOWN, DEPRESSED OR HOPELESS: NOT AT ALL

## 2024-12-08 NOTE — H&P
OB Triage H&P    Assessment/Plan    Zoraida Martinez is a 29 y.o.  at 35w1d, ALENA: 2025, by Ultrasound, who presents to triage with dFM.    DFM  - feeling normal fetal movement in triage  - tracing reassuring for >1 hour, multiple small variable decels initially but resolved  - KELLY 15    Contractions, c/f PTL  - SVE: 50/-3 -> /60/-3 on 2 hr recheck, pt more uncomfortable with contractions, ctx q3-5 mins  - will admit for possible  labor but will wait for BMTZ until more cervical change    Pregnancy notable for:  - Hx of IUFD at 23w  - Depression/anxiety: on Wellbutrin 200mg BID and Klonopin 0.5mg prn daily  - Rh neg s/p Rhogam at 28w    Plan   - pt to be admitted to L&D for prolonged monitoring and serial cervical exams    Discussed plan and reviewed with: Dr. Larry Chacon MD, PGY-1      AM Update: On reassessment, patient sleeping around 0800.  Prior to exam, patient given dose of 0.5mg of PO ativan before cervical exam.  Recheck at 1000, cervix unchanged.  60/-3.  Contractions spacing.  Dose of tylenol given to help with pain.  Discussed pain management at home and precautions to return discussed.  GBS collected.  No PCN allergy.  Tracing and plan reviewed with Dr. Schultz prior to discharge.      Karen Lr, APRN-CNP     Pregnancy Problems (from 24 to present)       Problem Noted Diagnosed Resolved    Supervision of high risk pregnancy in third trimester ([facility]) 2024 by Ashlee Cyr MD  No    Priority:  Medium       35 weeks gestation of pregnancy ([facility]) 2024 by Ashlee Cyr MD  No    Priority:  Medium       Overview Addendum 2024  5:03 PM by Ashlee Cyr MD     [x] Dating: by 10 week ultrasound (LMP unknown)  [x] Initial BMI: 29  [x] Prenatal Labs: reviewed, Rh negative, Rubella immune, Hgb 13.6  [x] Pap: 2024 ASCUS/+HR HPV, colpo performed and no high grade abnormalities suspected  [x] Aneuploidy Screening: normal first  trimester screen  [x] Baby ASA: not indicated, one moderate risk factor  [x] Anatomy US:  absent nasal bone, declines genetic counseling/testing  [x] 1hr GCT at 24-28wks: normal at 81  [x] Tdap (27-36wks): Given 10/11/24  [x] Flu Shot: Given 10/11/24  [x] RSV vaccine: Done  [x] Rhogam (if Rh neg): given 10/31  [x] Third trimester growth: serial growth for history of IUFD, EFW at 34 weeks 37%tile  [] GBS at 36 wks:  [x] Breastfeeding: Plans to breastfeed, has wearable pump  [x] PPBC: Discussed, declines  [x] 39 weeks discussion of IOL vs. Expectant management: plan for delivery ~38 weeks, request evening of   [x] Mode of delivery: Anticipate            Depression affecting pregnancy in first trimester, antepartum ([facility]) 2024 by Ashlee Cyr MD  No    Priority:  Medium       Overview Addendum 10/11/2024  1:48 PM by Ashlee Cyr MD     - previously diagnosed with anxiety, depression, ADHD, PTSD  - on wellbutin 200mg BID and klonipin PRN  - following with Dr. Jenkins         Rh negative state in antepartum period ([facility]) 2024 by Ashlee Cyr MD  No    Priority:  Medium       Overview Addendum 2024  1:33 PM by Ashlee Cyr MD     Rhogam given 10/31/24                 Subjective   Decreased fetal movement.  Denies vaginal bleeding., Denies leaking of fluid.      Patient states she woke up out of sleep around 11pm due to painful contractions. She did not feel fetal movement at this time and did home Dopplers which was around 160 bpm - states FHR never usually this high. She drank cold water and did not feel fetal movement still and presented to triage at this time. She has continued to have painful contractions since then.     Prenatal Provider: Ger    OB History    Para Term  AB Living   3 1 0 1 1 0   SAB IAB Ectopic Multiple Live Births   0 1 0 0 0      # Outcome Date GA Lbr Tim/2nd Weight Sex Type Anes PTL Lv   3 Current            2       M Vag-Spont   FD   1 IAB                No past surgical history on file.    Social History     Tobacco Use    Smoking status: Never    Smokeless tobacco: Never   Substance Use Topics    Alcohol use: Not Currently       No Known Allergies    Medications Prior to Admission   Medication Sig Dispense Refill Last Dose/Taking    buPROPion SR (Wellbutrin SR) 200 mg 12 hr tablet Take 1 tablet (200 mg) by mouth 2 times a day. 180 tablet 3 12/7/2024 at 10:30 AM    cetirizine (ZyrTEC) 10 mg tablet Take 1 tablet (10 mg) by mouth once daily. 90 tablet 0 12/7/2024 at 10:30 AM    clonazePAM (KlonoPIN) 0.5 mg tablet TAKE 1 TABLET BY MOUTH ONCE DAILY AS NEEDED FOR ANXIETY 15 tablet 0 12/7/2024 at  5:30 PM    prenatal vit,calc76-iron-folic (Prenatabs Rx) 29 mg iron- 1 mg tablet Take 1 tablet by mouth once daily.   12/7/2024 at 10:30 AM    fluticasone (Flonase) 50 mcg/actuation nasal spray Administer 1 spray into each nostril once daily. Shake gently. Before first use, prime pump. After use, clean tip and replace cap. (Patient not taking: Reported on 12/6/2024) 16 g 5      Objective     Last Vitals  Temp Pulse Resp BP MAP O2 Sat   36.1 °C (97 °F) 97 18 128/74 95 98 %     Blood Pressures         12/8/2024  0214             BP: 128/74             Physical Exam  General: NAD, mood appropriate  Cardiopulmonary: warm and well perfused, breathing comfortably on room air  Abdomen: Gravid, non-tender  Extremities: Symmetric  Speculum Exam: deferred  Cervix: 1 /60 /-3      Fetal Monitoring  Baseline: 155 bpm, Variability: moderate,  Accelerations: present and Decelerations: none  Uterine Activity: q3-5 minutes  Interpretation: Reactive    Bedside ultrasound: Yes  Cephalic, KELLY 15    Labs in chart were reviewed.          Prenatal labs reviewed, not remarkable.

## 2024-12-09 ENCOUNTER — TELEPHONE (OUTPATIENT)
Dept: OBSTETRICS AND GYNECOLOGY | Facility: CLINIC | Age: 29
End: 2024-12-09
Payer: MEDICARE

## 2024-12-09 RX ORDER — CLONAZEPAM 0.5 MG/1
TABLET ORAL
Qty: 15 TABLET | Refills: 0 | Status: SHIPPED | OUTPATIENT
Start: 2024-12-09 | End: 2024-12-12 | Stop reason: SDUPTHER

## 2024-12-09 NOTE — TELEPHONE ENCOUNTER
hi, Zoraida was in hosp this weekend, mentiond she is 35 wks pregnant, asking for a refill on klonopin, she is out will be seeing you this thursday, Can you give her a call today? its re:her situation as to why she went to hosp this weekend TY

## 2024-12-10 DIAGNOSIS — Z87.59 HISTORY OF IUFD: Primary | ICD-10-CM

## 2024-12-10 PROBLEM — B95.1 GROUP BETA STREP POSITIVE: Status: ACTIVE | Noted: 2024-12-10

## 2024-12-10 LAB — GP B STREP GENITAL QL CULT: ABNORMAL

## 2024-12-12 ENCOUNTER — TELEMEDICINE (OUTPATIENT)
Dept: BEHAVIORAL HEALTH | Facility: CLINIC | Age: 29
End: 2024-12-12
Payer: MEDICARE

## 2024-12-12 DIAGNOSIS — F41.9 ANXIETY: ICD-10-CM

## 2024-12-12 PROCEDURE — 99214 OFFICE O/P EST MOD 30 MIN: CPT | Performed by: STUDENT IN AN ORGANIZED HEALTH CARE EDUCATION/TRAINING PROGRAM

## 2024-12-12 RX ORDER — CLONAZEPAM 1 MG/1
1 TABLET ORAL DAILY PRN
Qty: 30 TABLET | Refills: 2 | Status: SHIPPED | OUTPATIENT
Start: 2024-12-12 | End: 2025-03-12
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
## 2024-12-13 ENCOUNTER — APPOINTMENT (OUTPATIENT)
Dept: OBSTETRICS AND GYNECOLOGY | Facility: CLINIC | Age: 29
End: 2024-12-13
Payer: MEDICARE

## 2024-12-13 VITALS — WEIGHT: 202 LBS | DIASTOLIC BLOOD PRESSURE: 74 MMHG | SYSTOLIC BLOOD PRESSURE: 110 MMHG | BODY MASS INDEX: 30.71 KG/M2

## 2024-12-13 DIAGNOSIS — Z87.59 HISTORY OF IUFD: ICD-10-CM

## 2024-12-13 DIAGNOSIS — F43.10 PTSD (POST-TRAUMATIC STRESS DISORDER): ICD-10-CM

## 2024-12-13 DIAGNOSIS — F32.A DEPRESSION AFFECTING PREGNANCY IN FIRST TRIMESTER, ANTEPARTUM (HHS-HCC): ICD-10-CM

## 2024-12-13 DIAGNOSIS — O09.93 SUPERVISION OF HIGH RISK PREGNANCY IN THIRD TRIMESTER (HHS-HCC): Primary | ICD-10-CM

## 2024-12-13 DIAGNOSIS — Z3A.35 35 WEEKS GESTATION OF PREGNANCY (HHS-HCC): ICD-10-CM

## 2024-12-13 DIAGNOSIS — O26.899 RH NEGATIVE STATE IN ANTEPARTUM PERIOD (HHS-HCC): ICD-10-CM

## 2024-12-13 DIAGNOSIS — O99.341 DEPRESSION AFFECTING PREGNANCY IN FIRST TRIMESTER, ANTEPARTUM (HHS-HCC): ICD-10-CM

## 2024-12-13 DIAGNOSIS — G43.909 MIGRAINE WITHOUT STATUS MIGRAINOSUS, NOT INTRACTABLE, UNSPECIFIED MIGRAINE TYPE: ICD-10-CM

## 2024-12-13 DIAGNOSIS — Z67.91 RH NEGATIVE STATE IN ANTEPARTUM PERIOD (HHS-HCC): ICD-10-CM

## 2024-12-13 DIAGNOSIS — I37.0 PULMONARY VALVE STENOSIS, UNSPECIFIED ETIOLOGY: ICD-10-CM

## 2024-12-13 DIAGNOSIS — B95.1 GROUP BETA STREP POSITIVE: ICD-10-CM

## 2024-12-13 NOTE — PROGRESS NOTES
Ob Follow-up  24     SUBJECTIVE    HPI: Zoraida Martinez is a 29 y.o.  at 35w6d here for RPNV.  She has irregular contractions, no bleeding, or LOF. Reports normal fetal movement. Patient reports severe PTSD symptoms after previous L&D triage visit. Pelvic/cervical exams are very stressful for her. She prefers to minimize these as much as possible.        OBJECTIVE  Visit Vitals  /74   Wt 91.6 kg (202 lb)   LMP  (LMP Unknown)   BMI 30.71 kg/m²   OB Status Pregnant   Smoking Status Never   BSA 2.1 m²        ASSESSMENT & PLAN    Zoraida Martinez is a 29 y.o.  at 35w6d here for the following concerns we addressed today:    Problem List Items Addressed This Visit       35 weeks gestation of pregnancy ([facility])    Overview     [x] Dating: by 10 week ultrasound (LMP unknown)  [x] Initial BMI: 29  [x] Prenatal Labs: reviewed, Rh negative, Rubella immune, Hgb 13.6  [x] Pap: 2024 ASCUS/+HR HPV, colpo performed and no high grade abnormalities suspected  [x] Aneuploidy Screening: normal first trimester screen  [x] Baby ASA: not indicated, one moderate risk factor  [x] Anatomy US:  absent nasal bone, declines genetic counseling/testing  [x] 1hr GCT at 24-28wks: normal at 81  [x] Tdap (27-36wks): Given 10/11/24  [x] Flu Shot: Given 10/11/24  [x] RSV vaccine: Done  [x] Rhogam (if Rh neg): given 10/31  [x] Third trimester growth: serial growth for history of IUFD, EFW at 34 weeks 37%tile  [x] GBS at 36 wks: positive, discussed antibiotics  [x] Breastfeeding: Plans to breastfeed, has wearable pump  [x] PPBC: Discussed, declines  [x] 39 weeks discussion of IOL vs. Expectant management: scheduled for IOL on . Patient may want to change this date  [x] Mode of delivery: Anticipate            Depression affecting pregnancy in first trimester, antepartum ([facility])    Overview     - previously diagnosed with anxiety, depression, ADHD, PTSD  - on wellbutin 200mg BID and klonipin PRN  - following with   Gregory         Group beta Strep positive    Overview     2024         History of IUFD    Overview     - 23 weeks fetal demise, delivered by   - No clear etiology based on chart review, normal microarray and placental pathology, no autopsy performed  - normal lupus anticoagulant, anticardiolipin antibodies and anti-B2 glycoprotein  - plan for serial growth ultrasound after 24 weeks   - plan for  testing at 32 weeks  - plan for delivery no later than 39 weeks. Can consider at 37 weeks for maternal anxiety          Relevant Orders    US fetal biophysical profile wo non stress testing    Migraine without status migrainosus, not intractable    Overview     Reviewed safe medications in pregnancy         PTSD (post-traumatic stress disorder)    Overview     Significant PTSD symptoms that are worsened by pelvic/cervical exams. She prefers to minimize them as much as possible.          Pulmonary valve stenosis    Overview     S/p MFM Consult  Normal maternal echo and BNP  No need for telemetry in labor or SBE prophylaxis           Rh negative state in antepartum period ([facility])    Overview     Rhogam given 10/31/24         Supervision of high risk pregnancy in third trimester ([facility]) - Primary       RTC in 1 week    Ashlee Cyr MD

## 2024-12-14 PROBLEM — F43.10 PTSD (POST-TRAUMATIC STRESS DISORDER): Status: ACTIVE | Noted: 2024-12-14

## 2024-12-14 NOTE — PROCEDURES
Zoraida Martinez, a  at 36w0d with an ALENA of 2025, by Ultrasound, was seen at [facility] for a nonstress test.    Non-Stress Test   Baseline Fetal Heart Rate for Non-Stress Test: 130 BPM  Variability in Waveform for Non-Stress Test: Moderate  Accelerations in Non-Stress Test: Yes  Decelerations in Non-Stress Test: Variable (A few very small decelerations not lasting 15 seconds and qualifying as a variable deceleration)  Contractions in Non-Stress Test: Irregular  Interpretation of Non-Stress Test   Interpretation of Non-Stress Test: Reactive      Overall reassuring NST. Patient desires to switch to weekly BPPs as fetal surveillance, order placed

## 2024-12-16 ENCOUNTER — TELEPHONE (OUTPATIENT)
Dept: OBSTETRICS AND GYNECOLOGY | Facility: CLINIC | Age: 29
End: 2024-12-16
Payer: MEDICARE

## 2024-12-16 DIAGNOSIS — Z87.59 HISTORY OF IUFD: Primary | ICD-10-CM

## 2024-12-17 ENCOUNTER — APPOINTMENT (OUTPATIENT)
Dept: OBSTETRICS AND GYNECOLOGY | Facility: CLINIC | Age: 29
End: 2024-12-17
Payer: MEDICARE

## 2024-12-17 VITALS — DIASTOLIC BLOOD PRESSURE: 60 MMHG | BODY MASS INDEX: 30.71 KG/M2 | WEIGHT: 202 LBS | SYSTOLIC BLOOD PRESSURE: 120 MMHG

## 2024-12-17 DIAGNOSIS — Z3A.36 36 WEEKS GESTATION OF PREGNANCY (HHS-HCC): Primary | ICD-10-CM

## 2024-12-17 DIAGNOSIS — F43.10 PTSD (POST-TRAUMATIC STRESS DISORDER): ICD-10-CM

## 2024-12-17 DIAGNOSIS — Z87.59 HISTORY OF IUFD: ICD-10-CM

## 2024-12-17 PROCEDURE — 0501F PRENATAL FLOW SHEET: CPT | Performed by: OBSTETRICS & GYNECOLOGY

## 2024-12-17 PROCEDURE — 59025 FETAL NON-STRESS TEST: CPT | Performed by: OBSTETRICS & GYNECOLOGY

## 2024-12-17 NOTE — PROGRESS NOTES
SUBJECTIVE  Zoraida Martinez is a 29 y.o.  at 36w3d with an estimated date of delivery of 2025, by Ultrasound who presents for a routine prenatal visit.    She denies loss of fluid, vaginal bleeding, regular contractions/cramping, and decreased fetal movement.     OBJECTIVE  Vitals:    24 1441   BP: 120/60   Weight: 91.6 kg (202 lb)          ASSESSMENT & PLAN    36 weeks gestation of pregnancy ([facility])  - Up to date on care    History of IUFD  - NST reactive and reassuring today    PTSD (post-traumatic stress disorder)  Significant PTSD symptoms that are worsened by pelvic/cervical exams. She prefers to minimize them as much as possible.   Prefers all female providers in labor and delivery    Reviewed options to minimize cervical exam including buccal cytotec. We reviewed potential benefits of epidural.     Follow up in 1 week for return OB visit.    Janice Berry MD  Obstetrics & Gynecology  24

## 2024-12-17 NOTE — ASSESSMENT & PLAN NOTE
Significant PTSD symptoms that are worsened by pelvic/cervical exams. She prefers to minimize them as much as possible.   Prefers all female providers in labor and delivery    Reviewed options to minimize cervical exam including buccal cytotec. We reviewed potential benefits of epidural.    High Dose Vitamin A Pregnancy And Lactation Text: High dose vitamin A therapy is contraindicated during pregnancy and breast feeding.

## 2024-12-17 NOTE — PROCEDURES
Zoraida Martinez, a  at 36w3d with an ALENA of 2025, by Ultrasound, was seen at [facility] for a nonstress test.    Non-Stress Test   Baseline Fetal Heart Rate for Non-Stress Test: 145 BPM  Variability in Waveform for Non-Stress Test: Moderate  Accelerations in Non-Stress Test: Yes, lasting at least 15 seconds, greater than/equal to 15 bpm  Decelerations in Non-Stress Test: None  NST Contraction Frequency: q2min, not feeling  Acoustic Stimulator for Non-Stress Test: No  Interpretation of Non-Stress Test   Interpretation of Non-Stress Test: Reactive      Janice Berry MD

## 2024-12-18 ENCOUNTER — TELEMEDICINE (OUTPATIENT)
Dept: BEHAVIORAL HEALTH | Facility: CLINIC | Age: 29
End: 2024-12-18
Payer: MEDICARE

## 2024-12-18 DIAGNOSIS — F32.A DEPRESSION AFFECTING PREGNANCY IN THIRD TRIMESTER, ANTEPARTUM (HHS-HCC): Primary | ICD-10-CM

## 2024-12-18 DIAGNOSIS — F90.0 ATTENTION DEFICIT HYPERACTIVITY DISORDER (ADHD), PREDOMINANTLY INATTENTIVE TYPE: ICD-10-CM

## 2024-12-18 DIAGNOSIS — F41.9 ANXIETY: ICD-10-CM

## 2024-12-18 DIAGNOSIS — O99.343 DEPRESSION AFFECTING PREGNANCY IN THIRD TRIMESTER, ANTEPARTUM (HHS-HCC): Primary | ICD-10-CM

## 2024-12-18 PROCEDURE — 99214 OFFICE O/P EST MOD 30 MIN: CPT | Performed by: STUDENT IN AN ORGANIZED HEALTH CARE EDUCATION/TRAINING PROGRAM
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
## 2024-12-23 ENCOUNTER — HOSPITAL ENCOUNTER (OUTPATIENT)
Dept: RADIOLOGY | Facility: HOSPITAL | Age: 29
Discharge: HOME | End: 2024-12-23
Payer: MEDICARE

## 2024-12-23 DIAGNOSIS — O09.91 SUPERVISION OF HIGH RISK PREGNANCY IN FIRST TRIMESTER (HHS-HCC): ICD-10-CM

## 2024-12-23 PROCEDURE — 76820 UMBILICAL ARTERY ECHO: CPT | Performed by: OBSTETRICS & GYNECOLOGY

## 2024-12-23 PROCEDURE — 76820 UMBILICAL ARTERY ECHO: CPT

## 2024-12-23 PROCEDURE — 76816 OB US FOLLOW-UP PER FETUS: CPT | Performed by: OBSTETRICS & GYNECOLOGY

## 2024-12-23 PROCEDURE — 76819 FETAL BIOPHYS PROFIL W/O NST: CPT | Performed by: OBSTETRICS & GYNECOLOGY

## 2024-12-23 PROCEDURE — 76816 OB US FOLLOW-UP PER FETUS: CPT

## 2024-12-24 ENCOUNTER — APPOINTMENT (OUTPATIENT)
Dept: OBSTETRICS AND GYNECOLOGY | Facility: CLINIC | Age: 29
End: 2024-12-24
Payer: MEDICARE

## 2024-12-24 VITALS — WEIGHT: 203 LBS | DIASTOLIC BLOOD PRESSURE: 60 MMHG | BODY MASS INDEX: 30.87 KG/M2 | SYSTOLIC BLOOD PRESSURE: 106 MMHG

## 2024-12-24 DIAGNOSIS — G43.909 MIGRAINE WITHOUT STATUS MIGRAINOSUS, NOT INTRACTABLE, UNSPECIFIED MIGRAINE TYPE: ICD-10-CM

## 2024-12-24 DIAGNOSIS — B95.1 GROUP BETA STREP POSITIVE: ICD-10-CM

## 2024-12-24 DIAGNOSIS — Z87.59 HISTORY OF IUFD: ICD-10-CM

## 2024-12-24 DIAGNOSIS — Z67.91 RH NEGATIVE STATE IN ANTEPARTUM PERIOD (HHS-HCC): ICD-10-CM

## 2024-12-24 DIAGNOSIS — F43.10 PTSD (POST-TRAUMATIC STRESS DISORDER): ICD-10-CM

## 2024-12-24 DIAGNOSIS — Z3A.37 37 WEEKS GESTATION OF PREGNANCY (HHS-HCC): ICD-10-CM

## 2024-12-24 DIAGNOSIS — I37.0 PULMONARY VALVE STENOSIS, UNSPECIFIED ETIOLOGY: ICD-10-CM

## 2024-12-24 DIAGNOSIS — O09.93 SUPERVISION OF HIGH RISK PREGNANCY IN THIRD TRIMESTER (HHS-HCC): Primary | ICD-10-CM

## 2024-12-24 DIAGNOSIS — F32.A DEPRESSION AFFECTING PREGNANCY IN FIRST TRIMESTER, ANTEPARTUM (HHS-HCC): ICD-10-CM

## 2024-12-24 DIAGNOSIS — O99.341 DEPRESSION AFFECTING PREGNANCY IN FIRST TRIMESTER, ANTEPARTUM (HHS-HCC): ICD-10-CM

## 2024-12-24 DIAGNOSIS — O36.5990 FETAL GROWTH RESTRICTION ANTEPARTUM (HHS-HCC): ICD-10-CM

## 2024-12-24 DIAGNOSIS — O26.899 RH NEGATIVE STATE IN ANTEPARTUM PERIOD (HHS-HCC): ICD-10-CM

## 2024-12-24 PROCEDURE — 0501F PRENATAL FLOW SHEET: CPT | Performed by: OBSTETRICS & GYNECOLOGY

## 2024-12-24 NOTE — PROGRESS NOTES
Ob Follow-up  24     SUBJECTIVE      HPI: Zoraida Martinez is a 29 y.o.  at 37w3d here for RPNV.  She has irregular contractions, no bleeding, or LOF. Reports normal fetal movement most of the time. Occasionally feels concerned and then is able to reassure herself. Patient reports worsening anxiety - psychiatry adjusted her medication.        OBJECTIVE  Visit Vitals  /60   Wt 92.1 kg (203 lb)   LMP  (LMP Unknown)   BMI 30.87 kg/m²   OB Status Pregnant   Smoking Status Never   BSA 2.1 m²        ASSESSMENT & PLAN    Zoraida Martinez is a 29 y.o.  at 37w3d here for the following concerns we addressed today:    Problem List Items Addressed This Visit       37 weeks gestation of pregnancy ([facility])    Overview     [x] Dating: by 10 week ultrasound (LMP unknown)  [x] Initial BMI: 29  [x] Prenatal Labs: reviewed, Rh negative, Rubella immune, Hgb 13.6  [x] Pap: 2024 ASCUS/+HR HPV, colpo performed and no high grade abnormalities suspected  [x] Aneuploidy Screening: normal first trimester screen  [x] Baby ASA: not indicated, one moderate risk factor  [x] Anatomy US:  absent nasal bone, declines genetic counseling/testing  [x] 1hr GCT at 24-28wks: normal at 81  [x] Tdap (27-36wks): Given 10/11/24  [x] Flu Shot: Given 10/11/24  [x] RSV vaccine: Done  [x] Rhogam (if Rh neg): given 10/31  [x] Third trimester growth: serial growth for history of IUFD, EFW at 34 weeks 37%tile  [x] GBS at 36 wks: positive, discussed antibiotics  [x] Breastfeeding: Plans to breastfeed, has wearable pump  [x] PPBC: Discussed, declines  [x] 39 weeks discussion of IOL vs. Expectant management: scheduled for IOL on   [x] Mode of delivery: Anticipate            Depression affecting pregnancy in first trimester, antepartum ([facility])    Overview     - previously diagnosed with anxiety, depression, ADHD, PTSD  - on wellbutin 200mg BID and klonipin PRN  - following with Dr. Jenkins         Fetal growth restriction antepartum  ([facility])    Overview     Diagnosed at 37 weeks based on AC 5%tile, AC 22%tile  Repeat BPP on  and then IOL on          Group beta Strep positive    Overview     2024         History of IUFD    Overview     - 23 weeks fetal demise, delivered by   - No clear etiology based on chart review, normal microarray and placental pathology, no autopsy performed  - normal lupus anticoagulant, anticardiolipin antibodies and anti-B2 glycoprotein  - plan for serial growth ultrasound after 24 weeks   - plan for  testing at 32 weeks  - plan for delivery no later than 39 weeks. Can consider at 37 weeks for maternal anxiety          Migraine without status migrainosus, not intractable    Overview     Reviewed safe medications in pregnancy         PTSD (post-traumatic stress disorder)    Overview     Significant PTSD symptoms that are worsened by pelvic/cervical exams. She prefers to minimize them as much as possible.   Prefers all female providers in labor and delivery    Reviewed options to minimize cervical exam including buccal cytotec. We reviewed potential benefits of epidural.          Pulmonary valve stenosis    Overview     S/p MFM Consult  Normal maternal echo and BNP  No need for telemetry in labor or SBE prophylaxis           Rh negative state in antepartum period ([facility])    Overview     Rhogam given 10/31/24         Supervision of high risk pregnancy in third trimester ([facility]) - Primary     Follow up for BPP on  and then IOL On     Ashlee Cyr MD

## 2024-12-26 ENCOUNTER — HOSPITAL ENCOUNTER (OUTPATIENT)
Dept: RADIOLOGY | Facility: HOSPITAL | Age: 29
Discharge: HOME | End: 2024-12-26
Payer: MEDICARE

## 2024-12-26 DIAGNOSIS — Z87.59 HISTORY OF IUFD: ICD-10-CM

## 2024-12-26 PROCEDURE — 76815 OB US LIMITED FETUS(S): CPT

## 2024-12-26 PROCEDURE — 76820 UMBILICAL ARTERY ECHO: CPT

## 2024-12-30 ENCOUNTER — APPOINTMENT (OUTPATIENT)
Dept: RADIOLOGY | Facility: CLINIC | Age: 29
End: 2024-12-30
Payer: MEDICARE

## 2024-12-30 ENCOUNTER — HOSPITAL ENCOUNTER (INPATIENT)
Facility: HOSPITAL | Age: 29
LOS: 5 days | Discharge: HOME | End: 2025-01-04
Attending: OBSTETRICS & GYNECOLOGY | Admitting: OBSTETRICS & GYNECOLOGY
Payer: MEDICARE

## 2024-12-30 ENCOUNTER — APPOINTMENT (OUTPATIENT)
Dept: OBSTETRICS AND GYNECOLOGY | Facility: HOSPITAL | Age: 29
End: 2024-12-30
Payer: MEDICARE

## 2024-12-30 DIAGNOSIS — Z87.59 HISTORY OF IUFD: ICD-10-CM

## 2024-12-30 DIAGNOSIS — Z34.90 ENCOUNTER FOR INDUCTION OF LABOR: Primary | ICD-10-CM

## 2024-12-30 LAB
ABO GROUP (TYPE) IN BLOOD: NORMAL
ANTIBODY SCREEN: NORMAL
ERYTHROCYTE [DISTWIDTH] IN BLOOD BY AUTOMATED COUNT: 12.4 % (ref 11.5–14.5)
HCT VFR BLD AUTO: 33.9 % (ref 36–46)
HGB BLD-MCNC: 11.9 G/DL (ref 12–16)
MCH RBC QN AUTO: 30.9 PG (ref 26–34)
MCHC RBC AUTO-ENTMCNC: 35.1 G/DL (ref 32–36)
MCV RBC AUTO: 88 FL (ref 80–100)
NRBC BLD-RTO: 0 /100 WBCS (ref 0–0)
PLATELET # BLD AUTO: 201 X10*3/UL (ref 150–450)
RBC # BLD AUTO: 3.85 X10*6/UL (ref 4–5.2)
RH FACTOR (ANTIGEN D): NORMAL
WBC # BLD AUTO: 10.9 X10*3/UL (ref 4.4–11.3)

## 2024-12-30 PROCEDURE — 99223 1ST HOSP IP/OBS HIGH 75: CPT

## 2024-12-30 PROCEDURE — 85027 COMPLETE CBC AUTOMATED: CPT | Performed by: STUDENT IN AN ORGANIZED HEALTH CARE EDUCATION/TRAINING PROGRAM

## 2024-12-30 PROCEDURE — 7210000002 HC LABOR PER HOUR

## 2024-12-30 PROCEDURE — 59050 FETAL MONITOR W/REPORT: CPT

## 2024-12-30 PROCEDURE — 86870 RBC ANTIBODY IDENTIFICATION: CPT

## 2024-12-30 PROCEDURE — 86901 BLOOD TYPING SEROLOGIC RH(D): CPT | Performed by: STUDENT IN AN ORGANIZED HEALTH CARE EDUCATION/TRAINING PROGRAM

## 2024-12-30 PROCEDURE — 2500000001 HC RX 250 WO HCPCS SELF ADMINISTERED DRUGS (ALT 637 FOR MEDICARE OP): Performed by: STUDENT IN AN ORGANIZED HEALTH CARE EDUCATION/TRAINING PROGRAM

## 2024-12-30 PROCEDURE — 36415 COLL VENOUS BLD VENIPUNCTURE: CPT | Performed by: STUDENT IN AN ORGANIZED HEALTH CARE EDUCATION/TRAINING PROGRAM

## 2024-12-30 PROCEDURE — 1120000001 HC OB PRIVATE ROOM DAILY

## 2024-12-30 PROCEDURE — 86780 TREPONEMA PALLIDUM: CPT | Performed by: STUDENT IN AN ORGANIZED HEALTH CARE EDUCATION/TRAINING PROGRAM

## 2024-12-30 PROCEDURE — 2500000004 HC RX 250 GENERAL PHARMACY W/ HCPCS (ALT 636 FOR OP/ED): Performed by: STUDENT IN AN ORGANIZED HEALTH CARE EDUCATION/TRAINING PROGRAM

## 2024-12-30 RX ORDER — NIFEDIPINE 10 MG/1
10 CAPSULE ORAL ONCE AS NEEDED
Status: DISCONTINUED | OUTPATIENT
Start: 2024-12-30 | End: 2025-01-01 | Stop reason: HOSPADM

## 2024-12-30 RX ORDER — METHYLERGONOVINE MALEATE 0.2 MG/ML
0.2 INJECTION INTRAVENOUS ONCE AS NEEDED
Status: DISCONTINUED | OUTPATIENT
Start: 2024-12-30 | End: 2025-01-01 | Stop reason: HOSPADM

## 2024-12-30 RX ORDER — CLONAZEPAM 0.5 MG/1
1 TABLET ORAL DAILY PRN
Status: DISCONTINUED | OUTPATIENT
Start: 2024-12-30 | End: 2024-12-31

## 2024-12-30 RX ORDER — MISOPROSTOL 200 UG/1
800 TABLET ORAL ONCE AS NEEDED
Status: DISCONTINUED | OUTPATIENT
Start: 2024-12-30 | End: 2025-01-01 | Stop reason: HOSPADM

## 2024-12-30 RX ORDER — TRANEXAMIC ACID 100 MG/ML
1000 INJECTION, SOLUTION INTRAVENOUS ONCE AS NEEDED
Status: DISCONTINUED | OUTPATIENT
Start: 2024-12-30 | End: 2025-01-01 | Stop reason: HOSPADM

## 2024-12-30 RX ORDER — LABETALOL HYDROCHLORIDE 5 MG/ML
20 INJECTION, SOLUTION INTRAVENOUS ONCE AS NEEDED
Status: DISCONTINUED | OUTPATIENT
Start: 2024-12-30 | End: 2025-01-01 | Stop reason: HOSPADM

## 2024-12-30 RX ORDER — OXYTOCIN 10 [USP'U]/ML
10 INJECTION, SOLUTION INTRAMUSCULAR; INTRAVENOUS ONCE AS NEEDED
Status: DISCONTINUED | OUTPATIENT
Start: 2024-12-30 | End: 2025-01-01 | Stop reason: HOSPADM

## 2024-12-30 RX ORDER — HYDRALAZINE HYDROCHLORIDE 20 MG/ML
5 INJECTION INTRAMUSCULAR; INTRAVENOUS ONCE AS NEEDED
Status: DISCONTINUED | OUTPATIENT
Start: 2024-12-30 | End: 2025-01-01 | Stop reason: HOSPADM

## 2024-12-30 RX ORDER — BUPROPION HYDROCHLORIDE 100 MG/1
200 TABLET, EXTENDED RELEASE ORAL 2 TIMES DAILY
Status: DISCONTINUED | OUTPATIENT
Start: 2024-12-30 | End: 2025-01-04 | Stop reason: HOSPADM

## 2024-12-30 RX ORDER — LIDOCAINE HYDROCHLORIDE 10 MG/ML
30 INJECTION, SOLUTION INFILTRATION; PERINEURAL ONCE AS NEEDED
Status: DISCONTINUED | OUTPATIENT
Start: 2024-12-30 | End: 2025-01-01 | Stop reason: HOSPADM

## 2024-12-30 RX ORDER — ONDANSETRON HYDROCHLORIDE 2 MG/ML
4 INJECTION, SOLUTION INTRAVENOUS EVERY 6 HOURS PRN
Status: DISCONTINUED | OUTPATIENT
Start: 2024-12-30 | End: 2025-01-01

## 2024-12-30 RX ORDER — CARBOPROST TROMETHAMINE 250 UG/ML
250 INJECTION, SOLUTION INTRAMUSCULAR ONCE AS NEEDED
Status: DISCONTINUED | OUTPATIENT
Start: 2024-12-30 | End: 2025-01-01 | Stop reason: HOSPADM

## 2024-12-30 RX ORDER — TERBUTALINE SULFATE 1 MG/ML
0.25 INJECTION SUBCUTANEOUS ONCE AS NEEDED
Status: DISCONTINUED | OUTPATIENT
Start: 2024-12-30 | End: 2025-01-01 | Stop reason: HOSPADM

## 2024-12-30 RX ORDER — METOCLOPRAMIDE HYDROCHLORIDE 5 MG/ML
10 INJECTION INTRAMUSCULAR; INTRAVENOUS EVERY 6 HOURS PRN
Status: DISCONTINUED | OUTPATIENT
Start: 2024-12-30 | End: 2025-01-01

## 2024-12-30 RX ORDER — METOCLOPRAMIDE 10 MG/1
10 TABLET ORAL EVERY 6 HOURS PRN
Status: DISCONTINUED | OUTPATIENT
Start: 2024-12-30 | End: 2025-01-01

## 2024-12-30 RX ORDER — PENICILLIN G 3000000 [IU]/50ML
3 INJECTION, SOLUTION INTRAVENOUS EVERY 4 HOURS
Status: DISCONTINUED | OUTPATIENT
Start: 2024-12-31 | End: 2025-01-01

## 2024-12-30 RX ORDER — ONDANSETRON 4 MG/1
4 TABLET, FILM COATED ORAL EVERY 6 HOURS PRN
Status: DISCONTINUED | OUTPATIENT
Start: 2024-12-30 | End: 2025-01-01

## 2024-12-30 RX ORDER — LOPERAMIDE HYDROCHLORIDE 2 MG/1
4 CAPSULE ORAL EVERY 2 HOUR PRN
Status: DISCONTINUED | OUTPATIENT
Start: 2024-12-30 | End: 2025-01-01 | Stop reason: HOSPADM

## 2024-12-30 RX ORDER — OXYTOCIN/0.9 % SODIUM CHLORIDE 30/500 ML
60 PLASTIC BAG, INJECTION (ML) INTRAVENOUS ONCE AS NEEDED
Status: DISCONTINUED | OUTPATIENT
Start: 2024-12-30 | End: 2025-01-01 | Stop reason: HOSPADM

## 2024-12-30 RX ADMIN — CLONAZEPAM 1 MG: 0.5 TABLET ORAL at 21:58

## 2024-12-30 RX ADMIN — MISOPROSTOL 25 MCG: 100 TABLET ORAL at 23:15

## 2024-12-30 RX ADMIN — PENICILLIN G POTASSIUM 5 MILLION UNITS: 5000000 INJECTION, POWDER, FOR SOLUTION INTRAMUSCULAR; INTRAVENOUS at 22:00

## 2024-12-30 SDOH — SOCIAL STABILITY: SOCIAL INSECURITY
WITHIN THE LAST YEAR, HAVE YOU BEEN RAPED OR FORCED TO HAVE ANY KIND OF SEXUAL ACTIVITY BY YOUR PARTNER OR EX-PARTNER?: NO

## 2024-12-30 SDOH — SOCIAL STABILITY: SOCIAL INSECURITY: WITHIN THE LAST YEAR, HAVE YOU BEEN HUMILIATED OR EMOTIONALLY ABUSED IN OTHER WAYS BY YOUR PARTNER OR EX-PARTNER?: NO

## 2024-12-30 SDOH — HEALTH STABILITY: MENTAL HEALTH: WERE YOU ABLE TO COMPLETE ALL THE BEHAVIORAL HEALTH SCREENINGS?: YES

## 2024-12-30 SDOH — ECONOMIC STABILITY: FOOD INSECURITY: WITHIN THE PAST 12 MONTHS, YOU WORRIED THAT YOUR FOOD WOULD RUN OUT BEFORE YOU GOT THE MONEY TO BUY MORE.: NEVER TRUE

## 2024-12-30 SDOH — SOCIAL STABILITY: SOCIAL INSECURITY: WITHIN THE LAST YEAR, HAVE YOU BEEN AFRAID OF YOUR PARTNER OR EX-PARTNER?: NO

## 2024-12-30 SDOH — SOCIAL STABILITY: SOCIAL INSECURITY: HAVE YOU HAD ANY THOUGHTS OF HARMING ANYONE ELSE?: NO

## 2024-12-30 SDOH — SOCIAL STABILITY: SOCIAL INSECURITY: ARE THERE ANY APPARENT SIGNS OF INJURIES/BEHAVIORS THAT COULD BE RELATED TO ABUSE/NEGLECT?: NO

## 2024-12-30 SDOH — SOCIAL STABILITY: SOCIAL INSECURITY: DOES ANYONE TRY TO KEEP YOU FROM HAVING/CONTACTING OTHER FRIENDS OR DOING THINGS OUTSIDE YOUR HOME?: NO

## 2024-12-30 SDOH — SOCIAL STABILITY: SOCIAL INSECURITY: ABUSE SCREEN: ADULT

## 2024-12-30 SDOH — SOCIAL STABILITY: SOCIAL INSECURITY
WITHIN THE LAST YEAR, HAVE YOU BEEN KICKED, HIT, SLAPPED, OR OTHERWISE PHYSICALLY HURT BY YOUR PARTNER OR EX-PARTNER?: NO

## 2024-12-30 SDOH — SOCIAL STABILITY: SOCIAL INSECURITY: ARE YOU OR HAVE YOU BEEN THREATENED OR ABUSED PHYSICALLY, EMOTIONALLY, OR SEXUALLY BY ANYONE?: NO

## 2024-12-30 SDOH — HEALTH STABILITY: MENTAL HEALTH: HOW OFTEN DO YOU HAVE SIX OR MORE DRINKS ON ONE OCCASION?: NEVER

## 2024-12-30 SDOH — ECONOMIC STABILITY: FOOD INSECURITY: HOW HARD IS IT FOR YOU TO PAY FOR THE VERY BASICS LIKE FOOD, HOUSING, MEDICAL CARE, AND HEATING?: SOMEWHAT HARD

## 2024-12-30 SDOH — SOCIAL STABILITY: SOCIAL INSECURITY: HAS ANYONE EVER THREATENED TO HURT YOUR FAMILY OR YOUR PETS?: NO

## 2024-12-30 SDOH — ECONOMIC STABILITY: FOOD INSECURITY: WITHIN THE PAST 12 MONTHS, THE FOOD YOU BOUGHT JUST DIDN'T LAST AND YOU DIDN'T HAVE MONEY TO GET MORE.: NEVER TRUE

## 2024-12-30 SDOH — HEALTH STABILITY: MENTAL HEALTH: WISH TO BE DEAD (PAST 1 MONTH): NO

## 2024-12-30 SDOH — HEALTH STABILITY: MENTAL HEALTH: NON-SPECIFIC ACTIVE SUICIDAL THOUGHTS (PAST 1 MONTH): NO

## 2024-12-30 SDOH — ECONOMIC STABILITY: HOUSING INSECURITY: DO YOU FEEL UNSAFE GOING BACK TO THE PLACE WHERE YOU ARE LIVING?: NO

## 2024-12-30 SDOH — HEALTH STABILITY: MENTAL HEALTH: HOW OFTEN DO YOU HAVE A DRINK CONTAINING ALCOHOL?: NEVER

## 2024-12-30 SDOH — SOCIAL STABILITY: SOCIAL INSECURITY: VERBAL ABUSE: DENIES

## 2024-12-30 SDOH — SOCIAL STABILITY: SOCIAL INSECURITY: PHYSICAL ABUSE: DENIES

## 2024-12-30 SDOH — SOCIAL STABILITY: SOCIAL INSECURITY: HAVE YOU HAD THOUGHTS OF HARMING ANYONE ELSE?: NO

## 2024-12-30 SDOH — ECONOMIC STABILITY: TRANSPORTATION INSECURITY: IN THE PAST 12 MONTHS, HAS LACK OF TRANSPORTATION KEPT YOU FROM MEDICAL APPOINTMENTS OR FROM GETTING MEDICATIONS?: NO

## 2024-12-30 SDOH — SOCIAL STABILITY: SOCIAL INSECURITY: DO YOU FEEL ANYONE HAS EXPLOITED OR TAKEN ADVANTAGE OF YOU FINANCIALLY OR OF YOUR PERSONAL PROPERTY?: NO

## 2024-12-30 SDOH — HEALTH STABILITY: MENTAL HEALTH: HAVE YOU USED ANY SUBSTANCES (CANABIS, COCAINE, HEROIN, HALLUCINOGENS, INHALANTS, ETC.) IN THE PAST 12 MONTHS?: NO

## 2024-12-30 SDOH — HEALTH STABILITY: MENTAL HEALTH: SUICIDAL BEHAVIOR (LIFETIME): NO

## 2024-12-30 SDOH — HEALTH STABILITY: MENTAL HEALTH: HOW MANY DRINKS CONTAINING ALCOHOL DO YOU HAVE ON A TYPICAL DAY WHEN YOU ARE DRINKING?: PATIENT DOES NOT DRINK

## 2024-12-30 SDOH — HEALTH STABILITY: MENTAL HEALTH: HAVE YOU USED ANY PRESCRIPTION DRUGS OTHER THAN PRESCRIBED IN THE PAST 12 MONTHS?: NO

## 2024-12-30 ASSESSMENT — LIFESTYLE VARIABLES
SKIP TO QUESTIONS 9-10: 1
AUDIT-C TOTAL SCORE: 0
SKIP TO QUESTIONS 9-10: 1
HOW OFTEN DO YOU HAVE 6 OR MORE DRINKS ON ONE OCCASION: NEVER
HOW OFTEN DO YOU HAVE A DRINK CONTAINING ALCOHOL: NEVER
HOW MANY STANDARD DRINKS CONTAINING ALCOHOL DO YOU HAVE ON A TYPICAL DAY: PATIENT DOES NOT DRINK
AUDIT-C TOTAL SCORE: 0
AUDIT-C TOTAL SCORE: 0

## 2024-12-30 ASSESSMENT — PAIN SCALES - GENERAL: PAINLEVEL_OUTOF10: 0 - NO PAIN

## 2024-12-30 ASSESSMENT — ACTIVITIES OF DAILY LIVING (ADL): LACK_OF_TRANSPORTATION: NO

## 2024-12-31 ENCOUNTER — ANESTHESIA (OUTPATIENT)
Dept: OBSTETRICS AND GYNECOLOGY | Facility: HOSPITAL | Age: 29
End: 2024-12-31
Payer: MEDICARE

## 2024-12-31 ENCOUNTER — ANESTHESIA EVENT (OUTPATIENT)
Dept: OBSTETRICS AND GYNECOLOGY | Facility: HOSPITAL | Age: 29
End: 2024-12-31
Payer: MEDICARE

## 2024-12-31 PROBLEM — G43.909 MIGRAINE WITHOUT STATUS MIGRAINOSUS, NOT INTRACTABLE: Status: RESOLVED | Noted: 2024-06-14 | Resolved: 2024-12-31

## 2024-12-31 LAB
BB ANTIBODY IDENTIFICATION: NORMAL
CASE #: NORMAL
PATH REV-IMMUNOHEMATOLOGY-PR30: NORMAL
TREPONEMA PALLIDUM IGG+IGM AB [PRESENCE] IN SERUM OR PLASMA BY IMMUNOASSAY: NONREACTIVE

## 2024-12-31 PROCEDURE — 2500000004 HC RX 250 GENERAL PHARMACY W/ HCPCS (ALT 636 FOR OP/ED): Performed by: STUDENT IN AN ORGANIZED HEALTH CARE EDUCATION/TRAINING PROGRAM

## 2024-12-31 PROCEDURE — 1120000001 HC OB PRIVATE ROOM DAILY

## 2024-12-31 PROCEDURE — 3700000014 EPIDURAL BLOCK: Mod: GC | Performed by: STUDENT IN AN ORGANIZED HEALTH CARE EDUCATION/TRAINING PROGRAM

## 2024-12-31 PROCEDURE — 3E0P7VZ INTRODUCTION OF HORMONE INTO FEMALE REPRODUCTIVE, VIA NATURAL OR ARTIFICIAL OPENING: ICD-10-PCS | Performed by: OBSTETRICS & GYNECOLOGY

## 2024-12-31 PROCEDURE — 7210000002 HC LABOR PER HOUR

## 2024-12-31 PROCEDURE — 3E0DXGC INTRODUCTION OF OTHER THERAPEUTIC SUBSTANCE INTO MOUTH AND PHARYNX, EXTERNAL APPROACH: ICD-10-PCS | Performed by: OBSTETRICS & GYNECOLOGY

## 2024-12-31 PROCEDURE — 2500000001 HC RX 250 WO HCPCS SELF ADMINISTERED DRUGS (ALT 637 FOR MEDICARE OP)

## 2024-12-31 PROCEDURE — 2500000004 HC RX 250 GENERAL PHARMACY W/ HCPCS (ALT 636 FOR OP/ED)

## 2024-12-31 PROCEDURE — 2500000001 HC RX 250 WO HCPCS SELF ADMINISTERED DRUGS (ALT 637 FOR MEDICARE OP): Performed by: STUDENT IN AN ORGANIZED HEALTH CARE EDUCATION/TRAINING PROGRAM

## 2024-12-31 PROCEDURE — 2500000004 HC RX 250 GENERAL PHARMACY W/ HCPCS (ALT 636 FOR OP/ED): Performed by: ADVANCED PRACTICE MIDWIFE

## 2024-12-31 RX ORDER — LIDOCAINE HYDROCHLORIDE AND EPINEPHRINE 15; 5 MG/ML; UG/ML
INJECTION, SOLUTION EPIDURAL AS NEEDED
Status: DISCONTINUED | OUTPATIENT
Start: 2024-12-31 | End: 2025-01-01

## 2024-12-31 RX ORDER — FENTANYL/ROPIVACAINE/NS/PF 2MCG/ML-.2
0-25 PLASTIC BAG, INJECTION (ML) INJECTION CONTINUOUS
Status: DISCONTINUED | OUTPATIENT
Start: 2024-12-31 | End: 2025-01-01

## 2024-12-31 RX ORDER — NALBUPHINE HYDROCHLORIDE 10 MG/ML
10 INJECTION INTRAMUSCULAR; INTRAVENOUS; SUBCUTANEOUS ONCE
Status: COMPLETED | OUTPATIENT
Start: 2024-12-31 | End: 2024-12-31

## 2024-12-31 RX ORDER — SODIUM CHLORIDE 9 MG/ML
20 INJECTION, SOLUTION INTRAVENOUS CONTINUOUS
Status: DISCONTINUED | OUTPATIENT
Start: 2024-12-31 | End: 2025-01-01

## 2024-12-31 RX ORDER — OXYTOCIN/0.9 % SODIUM CHLORIDE 30/500 ML
2-30 PLASTIC BAG, INJECTION (ML) INTRAVENOUS CONTINUOUS
Status: DISCONTINUED | OUTPATIENT
Start: 2024-12-31 | End: 2025-01-01

## 2024-12-31 RX ORDER — CLONAZEPAM 0.5 MG/1
0.5 TABLET ORAL DAILY PRN
Status: DISCONTINUED | OUTPATIENT
Start: 2024-12-31 | End: 2025-01-01

## 2024-12-31 RX ADMIN — NALBUPHINE HYDROCHLORIDE 10 MG: 10 INJECTION, SOLUTION INTRAMUSCULAR; INTRAVENOUS; SUBCUTANEOUS at 13:10

## 2024-12-31 RX ADMIN — PENICILLIN G 3 MILLION UNITS: 3000000 INJECTION, SOLUTION INTRAVENOUS at 05:40

## 2024-12-31 RX ADMIN — MISOPROSTOL 25 MCG: 100 TABLET ORAL at 04:08

## 2024-12-31 RX ADMIN — NALBUPHINE HYDROCHLORIDE 10 MG: 10 INJECTION, SOLUTION INTRAMUSCULAR; INTRAVENOUS; SUBCUTANEOUS at 03:43

## 2024-12-31 RX ADMIN — PENICILLIN G 3 MILLION UNITS: 3000000 INJECTION, SOLUTION INTRAVENOUS at 01:20

## 2024-12-31 RX ADMIN — LIDOCAINE HYDROCHLORIDE AND EPINEPHRINE 3 ML: 15; 5 INJECTION, SOLUTION EPIDURAL at 18:52

## 2024-12-31 RX ADMIN — BUPROPION HYDROCHLORIDE 200 MG: 100 TABLET, FILM COATED, EXTENDED RELEASE ORAL at 09:17

## 2024-12-31 RX ADMIN — PENICILLIN G 3 MILLION UNITS: 3000000 INJECTION, SOLUTION INTRAVENOUS at 17:40

## 2024-12-31 RX ADMIN — CLONAZEPAM 1 MG: 0.5 TABLET ORAL at 12:00

## 2024-12-31 RX ADMIN — BUPROPION HYDROCHLORIDE 200 MG: 100 TABLET, FILM COATED, EXTENDED RELEASE ORAL at 20:57

## 2024-12-31 RX ADMIN — CLONAZEPAM 0.5 MG: 0.5 TABLET ORAL at 20:38

## 2024-12-31 RX ADMIN — SODIUM CHLORIDE 20 ML/HR: 9 INJECTION, SOLUTION INTRAVENOUS at 08:04

## 2024-12-31 RX ADMIN — Medication 2 MILLI-UNITS/MIN: at 13:27

## 2024-12-31 RX ADMIN — Medication 4 ML: at 18:57

## 2024-12-31 RX ADMIN — LIDOCAINE HYDROCHLORIDE AND EPINEPHRINE 2 ML: 15; 5 INJECTION, SOLUTION EPIDURAL at 18:54

## 2024-12-31 RX ADMIN — PENICILLIN G 3 MILLION UNITS: 3000000 INJECTION, SOLUTION INTRAVENOUS at 20:57

## 2024-12-31 RX ADMIN — SODIUM CHLORIDE 500 ML: 9 INJECTION, SOLUTION INTRAVENOUS at 18:30

## 2024-12-31 RX ADMIN — PENICILLIN G 3 MILLION UNITS: 3000000 INJECTION, SOLUTION INTRAVENOUS at 09:17

## 2024-12-31 RX ADMIN — PENICILLIN G 3 MILLION UNITS: 3000000 INJECTION, SOLUTION INTRAVENOUS at 13:37

## 2024-12-31 RX ADMIN — MISOPROSTOL 25 MCG: 100 TABLET ORAL at 01:18

## 2024-12-31 RX ADMIN — Medication 10 ML/HR: at 18:58

## 2024-12-31 SDOH — HEALTH STABILITY: MENTAL HEALTH: CURRENT SMOKER: 0

## 2024-12-31 ASSESSMENT — PAIN SCALES - GENERAL
PAINLEVEL_OUTOF10: 0 - NO PAIN

## 2024-12-31 NOTE — ANESTHESIA PREPROCEDURE EVALUATION
Patient: Zoraida Martinez    Evaluation Method: In-person visit    Procedure Information    Date: 24  Procedure: Labor Consult         Relevant Problems   Anesthesia  Prior epidural w/o issue, no hx GA, no family hx problems with GA      Cardiac  TTE completed, no current valvular issues, EF WNL.   (+) Pulmonary valve stenosis      Pulmonary (within normal limits)      Neuro   (+) Depression affecting pregnancy in first trimester, antepartum ([facility])   (+) PTSD (post-traumatic stress disorder)      GI (within normal limits)      /Renal (within normal limits)      Liver (within normal limits)      Endocrine (within normal limits)      Hematology (within normal limits)      Musculoskeletal (within normal limits)      GYN   (+) 37 weeks gestation of pregnancy ([facility])   (+) Supervision of high risk pregnancy in third trimester ([facility])       Clinical information reviewed:    Allergies  Meds               NPO Detail:  Still eating at time of interview     OB/Gyn Evaluation    Present Pregnancy    Patient is pregnant now ( for IOL @ 38.2 weeks in setting of hx IUFD).   Obstetric History    : IUFD.         Visit Vitals  /68   Pulse 97   Temp 36.6 °C (97.9 °F) (Oral)   Resp 16         Physical Exam    Airway  Mallampati: II  TM distance: >3 FB  Neck ROM: full     Cardiovascular    Dental   Comments: Denies issues   Pulmonary    Abdominal            Anesthesia Plan    History of general anesthesia?: no  History of complications of general anesthesia?: no    ASA 2     The patient is not a current smoker.    Anesthetic plan and risks discussed with patient.  Use of blood products discussed with patient who consented to blood products.

## 2024-12-31 NOTE — ANESTHESIA PREPROCEDURE EVALUATION
Patient: Zoraida Martinez    Evaluation Method: In-person visit    Procedure Information    Date: 24  Procedure: Labor Consult         Relevant Problems   Anesthesia  Prior epidural w/o issue, no hx GA, no family hx problems with GA      Cardiac  TTE completed, no current valvular issues, EF WNL.   (+) Pulmonary valve stenosis      Pulmonary (within normal limits)      Neuro   (+) Depression affecting pregnancy in first trimester, antepartum ([facility])   (+) PTSD (post-traumatic stress disorder)      GI (within normal limits)      /Renal (within normal limits)      Liver (within normal limits)      Endocrine (within normal limits)      Hematology (within normal limits)      Musculoskeletal (within normal limits)      GYN   (+) 37 weeks gestation of pregnancy ([facility])   (+) Supervision of high risk pregnancy in third trimester ([facility])       Clinical information reviewed:    Allergies  Meds               NPO Detail:  Still eating at time of interview     OB/Gyn Evaluation    Present Pregnancy    Patient is pregnant now ( for IOL @ 38.2 weeks in setting of hx IUFD).   Obstetric History    : IUFD.         Visit Vitals  /70   Pulse 93   Temp 36.8 °C (98.2 °F) (Temporal)   Resp 18         Physical Exam    Airway  Mallampati: II  TM distance: >3 FB  Neck ROM: full     Cardiovascular    Dental   Comments: Denies issues   Pulmonary    Abdominal            Anesthesia Plan    History of general anesthesia?: no  History of complications of general anesthesia?: no    ASA 2     The patient is not a current smoker.    Anesthetic plan and risks discussed with patient.  Use of blood products discussed with patient who consented to blood products.

## 2024-12-31 NOTE — ANESTHESIA PROCEDURE NOTES
Epidural Block    Patient location during procedure: OB  Start time: 12/31/2024 6:33 PM  End time: 12/31/2024 6:55 PM  Reason for block: labor analgesia  Staffing  Performed: resident   Authorized by: Joseph Loja MD    Performed by: Deedee Killian MD    Preanesthetic Checklist  Completed: patient identified, IV checked, risks and benefits discussed, surgical consent, pre-op evaluation, timeout performed and sterile techniques followed  Block Timeout  RN/Licensed healthcare professional reads aloud to the Anesthesia provider and entire team: Patient identity, procedure with side and site, patient position, and as applicable the availability of implants/special equipment/special requirements.  Patient on coagulant treatment: no  Timeout performed at: 12/31/2024 6:34 PM  Block Placement  Patient position: sitting  Prep: ChloraPrep  Sterility prep: cap, drape, gloves and mask  Sedation level: no sedation  Patient monitoring: blood pressure, continuous pulse oximetry and heart rate  Approach: midline  Local numbing: lidocaine 1% to skin and subcutaneous tissues  Vertebral space: lumbar  Lumbar location: L3-L4  Epidural  Loss of resistance technique: saline  Guidance: landmark technique        Needle  Needle type: Tuohy   Needle gauge: 17  Needle length: 8.9cm  Needle insertion depth: 5 cm  Catheter type: multi-orifice  Catheter size: 19 G  Catheter at skin depth: 10 cm  Catheter securement method: clear occlusive dressing and liquid medical adhesive    Test dose: lidocaine 1.5% with epinephrine 1-to-200,000  Test dose: lidocaine 1.5% with epinephrine 1-to-200,000  Test dose result: no positive test dose    PCEA  Medication concentration used: 0.2% Ropivacaine with 2 mcg/mL Fentanyl  Dose (mL): 4  Lockout (minutes): 30  1-Hour Limit (boluses/hr): 2  Basal Rate: 10        Assessment  Sensory level: T10 bilateral  Block outcome: patient comfortable  Number of attempts: 1  Events: no positive test dose  Procedure  assessment: patient tolerated procedure well with no immediate complications           Post-Care Instructions: I reviewed with the patient in detail post-care instructions. Patient is not to engage in any heavy lifting, exercise, or swimming for the next 14 days. Should the patient develop any fevers, chills, bleeding, severe pain patient will contact the office immediately.

## 2024-12-31 NOTE — PROGRESS NOTES
Intrapartum Progress Note    Assessment/Plan   Zoraida Martinez is a 29 y.o.  at 38w4d, ALENA: 2025, by Ultrasound admitted for IOL in the s/o FGR diagnosed @37wga and hx IUFD.     IOL  Method: pit   Pain management: epidural as requested   CEFM, currently Category I  GBS: pos PCN   Next exam: Will recheck as clinically indicated by maternal or fetal status or per pt request     Pregnancy Notables  Hx IUFD @ 23wga   FGR diagnosed @ 37wga   IVAN, MDD, ADHD, PTSD with Dr Jenkins on wellbutrin 200 BID and klonopin 0.5 PRN, discussed with patient cannot have benzos in the active phase of labor    Pap ASCUS + HRHPV with no high grade abnormalities on colpo   Fetus absent nasal bone on anatomy scan, declined genetics   GBS pos   Pulmonary valve stenosis s/p MFM consult with normal maternal echo and BNP no need for tele on L&D  Rh neg s/p rhogam     Seen and discussed with Dr. Jamal Miles MD PGY-1   OBGYN    Subjective   Doing well. Overall not feeling much and feeling overwhelmed that she is not making a lot of change. Otherwise doing well, no other complaints at this time.      Objective   Last Vitals:  Temp Pulse Resp BP MAP Pulse Ox   36.8 °C (98.2 °F) 89 14 101/65   100 %     Vitals Min/Max Last 24 Hours:  Temp  Min: 36.1 °C (97 °F)  Max: 36.9 °C (98.4 °F)  Pulse  Min: 69  Max: 179  Resp  Min: 14  Max: 18  BP  Min: 96/71  Max: 131/71    Intake/Output:  No intake or output data in the 24 hours ending 25 0038    Physical Examination:  General: no acute distress  HEENT: normocephalic, atraumatic  Heart: warm and well perfused  Lungs: breathing comfortably on room air  Abdomen: gravid  Extremities: moving all extremities  Neuro: awake and conversant  Psych: appropriate mood and affect    FHT: 135/mod/+accel/-decel  [place]: irregular ctx    Lab Review:  Labs in chart have been reviewed   Hb 11.9

## 2024-12-31 NOTE — PROGRESS NOTES
Intrapartum Progress Note    Assessment/Plan   Zoraida Martinez is a 30 yo  at 38w2d by 10w US presenting for IOL in the s/o FGR dx at 37wga with hx IUFD.     IOL  Continue to monitor for cervical change  S/p vaginal cyotec and oral cyto #1-2  Possible CRB  Epidural as requested  Delivery: desires vaginal    Fetal Status:  - CEFM, cat I currently  -EFW 3000g extrapolated from  22% with AC 5%; FGR diagnosed @ 37wga   -GBS pos, PCN     Pregnancy Notables  Hx IUFD @ 23wga   FGR diagnosed @ 37wga   IVAN, MDD, ADHD, PTSD with Dr Jenkins on wellbutrin 200 BID and klonopin 0.5 PRN, discussed with patient cannot have benzos in the active phase of labor    Pap ASCUS + HRHPV with no high grade abnormalities on colpo   Fetus absent nasal bone on anatomy scan, declined genetics   GBS pos   Pulmonary valve stenosis s/p MFM consult with normal maternal echo and BNP no need for tele on L&D  Rh neg s/p rhogam     BCM: Declined    To be seen and d/w Dr. Delia Scott MD  PGY-1, Obstetrics and Gynecology     Assessment & Plan  Encounter for induction of labor    Pregnancy Problems (from 24 to present)       Problem Noted Diagnosed Resolved    Encounter for induction of labor 2024 by Shirley Hernadez MD  No    Priority:  Medium       Fetal growth restriction antepartum ([facility]) 2024 by Ashlee Cyr MD  No    Priority:  Medium       Overview Signed 2024 12:00 PM by Ashlee Cyr MD     Diagnosed at 37 weeks based on AC 5%tile, AC 22%tile  Repeat BPP on  and then IOL on          Supervision of high risk pregnancy in third trimester ([facility]) 2024 by Ashlee Cyr MD  No    Priority:  Medium       37 weeks gestation of pregnancy ([facility]) 2024 by Ashlee Cyr MD  No    Priority:  Medium       Overview Addendum 2024 11:59 AM by Ashlee Cyr MD     [x] Dating: by 10 week ultrasound (LMP unknown)  [x] Initial BMI: 29  [x] Prenatal Labs:  reviewed, Rh negative, Rubella immune, Hgb 13.6  [x] Pap: 2024 ASCUS/+HR HPV, colpo performed and no high grade abnormalities suspected  [x] Aneuploidy Screening: normal first trimester screen  [x] Baby ASA: not indicated, one moderate risk factor  [x] Anatomy US:  absent nasal bone, declines genetic counseling/testing  [x] 1hr GCT at 24-28wks: normal at 81  [x] Tdap (27-36wks): Given 10/11/24  [x] Flu Shot: Given 10/11/24  [x] RSV vaccine: Done  [x] Rhogam (if Rh neg): given 10/31  [x] Third trimester growth: serial growth for history of IUFD, EFW at 34 weeks 37%tile  [x] GBS at 36 wks: positive, discussed antibiotics  [x] Breastfeeding: Plans to breastfeed, has wearable pump  [x] PPBC: Discussed, declines  [x] 39 weeks discussion of IOL vs. Expectant management: scheduled for IOL on   [x] Mode of delivery: Anticipate            Depression affecting pregnancy in first trimester, antepartum ([facility]) 2024 by Ashlee Cyr MD  No    Priority:  Medium       Overview Addendum 10/11/2024  1:48 PM by Ashlee Cyr MD     - previously diagnosed with anxiety, depression, ADHD, PTSD  - on wellbutin 200mg BID and klonipin PRN  - following with Dr. Jenkins         Rh negative state in antepartum period ([facility]) 2024 by Ashlee Cyr MD  No    Priority:  Medium       Overview Addendum 2024  1:33 PM by Ashlee Cyr MD     Rhogam given 10/31/24                 Subjective   Doing well. Resting comfortably. Feeling some of her contractions.     Objective   Last Vitals:  Temp Pulse Resp BP MAP Pulse Ox   36.1 °C (97 °F) 78 18 110/66 82 (!) 95 %     Vitals Min/Max Last 24 Hours:  Temp  Min: 36.1 °C (97 °F)  Max: 36.7 °C (98.1 °F)  Pulse  Min: 78  Max: 108  Resp  Min: 16  Max: 18  BP  Min: 110/66  Max: 121/75  MAP (mmHg)  Min: 82  Max: 92    Intake/Output:  No intake or output data in the 24 hours ending 24 0759    Physical Examination:  Constitutional: No visible  distress, alert and cooperative  Respiratory/Thorax: Normal respiratory effort on RA  Cardiovascular: Reg rate  Gastrointestinal: soft, nondistended, nontender, gravid  Neurological: A&Ox3  Psychological: Appropriate mood and behavior    FHT: 140/mod/+accel/-decel  [place]: ctx q1-2 mins    Lab Review:  Lab Results   Component Value Date    WBC 10.9 12/30/2024    HGB 11.9 (L) 12/30/2024    HCT 33.9 (L) 12/30/2024     12/30/2024     0   Lab Value Date/Time    GRPBSTREP (A) 12/08/2024 1011     Isolated: Streptococcus agalactiae (Group B Streptococcus)

## 2024-12-31 NOTE — H&P
OB Admission H&P    Assessment/Plan    oZraida Martinez is a 29 y.o.  at 38w3d, ALENA: 2025, by 10w Ultrasound, who presents for Induction of Labor in the s/o FGR diagnosed at 37w and hx IUFD.    Plan  -Admit to L&D, consented  -T&S, CBC, and Syphilis  -Epidural at patient request  -Recheck as clinically indicated by maternal or fetal status  -Plan to initiate induction with oral cytotec per pt preference with extreme discomfort with cervical or vaginal exams of any kind, will check cervix after 2 doses of oral cyto    Fetal Status  -NST reactive, reassuring   -Presentation vertex based on ultrasound  -EFW 3000g extrapolated from  22% with AC 5%; FGR diagnosed @ 37wga   -GBS pos for PCN    Postpartum  Contraception Plan: patient declined    Pregnancy notables  Hx IUFD @ 23wga   FGR diagnosed @ 37wga   IVAN, MDD, ADHD, PTSD with Dr Jenkins on wellbutrin 200 BID and klonopin 0.5 PRN   Pap ASCUS + HRHPV with no high grade abnormalities on colpo   Fetus absent nasal bone on anatomy scan, declined genetics   GBS pos   Pulmonary valve stenosis s/p MFM consult with normal maternal echo and BNP no need tele on L&D  Rh neg s/p rhogam     Pregnancy Problems (from 24 to present)       Problem Noted Diagnosed Resolved    Encounter for induction of labor 2024 by Shirley Hernadez MD  No    Priority:  Medium       Fetal growth restriction antepartum ([facility]) 2024 by Ashlee Cyr MD  No    Priority:  Medium       Overview Signed 2024 12:00 PM by Ashlee Cyr MD     Diagnosed at 37 weeks based on AC 5%tile, AC 22%tile  Repeat BPP on  and then IOL on          Supervision of high risk pregnancy in third trimester ([facility]) 2024 by Ashlee Cyr MD  No    Priority:  Medium       37 weeks gestation of pregnancy ([facility]) 2024 by Ashlee Cyr MD  No    Priority:  Medium       Overview Addendum 2024 11:59 AM by Ashlee Cyr MD     [x]  Dating: by 10 week ultrasound (LMP unknown)  [x] Initial BMI: 29  [x] Prenatal Labs: reviewed, Rh negative, Rubella immune, Hgb 13.6  [x] Pap: 2024 ASCUS/+HR HPV, colpo performed and no high grade abnormalities suspected  [x] Aneuploidy Screening: normal first trimester screen  [x] Baby ASA: not indicated, one moderate risk factor  [x] Anatomy US:  absent nasal bone, declines genetic counseling/testing  [x] 1hr GCT at 24-28wks: normal at 81  [x] Tdap (27-36wks): Given 10/11/24  [x] Flu Shot: Given 10/11/24  [x] RSV vaccine: Done  [x] Rhogam (if Rh neg): given 10/31  [x] Third trimester growth: serial growth for history of IUFD, EFW at 34 weeks 37%tile  [x] GBS at 36 wks: positive, discussed antibiotics  [x] Breastfeeding: Plans to breastfeed, has wearable pump  [x] PPBC: Discussed, declines  [x] 39 weeks discussion of IOL vs. Expectant management: scheduled for IOL on   [x] Mode of delivery: Anticipate            Depression affecting pregnancy in first trimester, antepartum ([facility]) 2024 by Ashlee Cyr MD  No    Priority:  Medium       Overview Addendum 10/11/2024  1:48 PM by Ashlee Cyr MD     - previously diagnosed with anxiety, depression, ADHD, PTSD  - on wellbutin 200mg BID and klonipin PRN  - following with Dr. Jenkins         Rh negative state in antepartum period ([facility]) 2024 by Ashlee Cyr MD  No    Priority:  Medium       Overview Addendum 2024  1:33 PM by Ashlee Cyr MD     Rhogam given 10/31/24                 Subjective   Pt reports normal fetal movement, no bleeding or loss of fluid. Having a lot of anxiety about being here and needing cervical exams.     Prenatal Provider Ger/Jamal     OB History    Para Term  AB Living   3 1 0 1 1 0   SAB IAB Ectopic Multiple Live Births   0 1 0 0 0      # Outcome Date GA Lbr Tim/2nd Weight Sex Type Anes PTL Lv   3 Current            2      M Vag-Spont   FD   1 IAB                 No past surgical history on file.    Social History     Tobacco Use    Smoking status: Never    Smokeless tobacco: Never   Substance Use Topics    Alcohol use: Not Currently       No Known Allergies    Medications Prior to Admission   Medication Sig Dispense Refill Last Dose/Taking    buPROPion SR (Wellbutrin SR) 200 mg 12 hr tablet Take 1 tablet (200 mg) by mouth 2 times a day. 180 tablet 3 12/30/2024 at  6:30 PM    clonazePAM (KlonoPIN) 1 mg tablet Take 1 tablet (1 mg) by mouth once daily as needed for anxiety. TAKE 1 TABLET BY MOUTH ONCE DAILY AS NEEDED FOR ANXIETY 30 tablet 2 12/29/2024 Evening    prenatal vit,calc76-iron-folic (Prenatabs Rx) 29 mg iron- 1 mg tablet Take 1 tablet by mouth once daily.   12/30/2024    cetirizine (ZyrTEC) 10 mg tablet Take 1 tablet (10 mg) by mouth once daily. 90 tablet 0     fluticasone (Flonase) 50 mcg/actuation nasal spray Administer 1 spray into each nostril once daily. Shake gently. Before first use, prime pump. After use, clean tip and replace cap. (Patient not taking: Reported on 12/30/2024) 16 g 5 More than a month     Objective     Last Vitals  Temp Pulse Resp BP MAP O2 Sat     96   111/75 88 98 %     Blood Pressures         12/30/2024 2135             BP: 111/75             Physical Exam  General: NAD, mood appropriate  Cardiopulmonary: warm and well perfused, breathing comfortably on room air  Abdomen: Gravid, non-tender  Extremities: Symmetric     Fetal Monitoring  Baseline: 145 bpm, Variability: moderate,  Accelerations: present and Decelerations: none  Uterine Activity: q2-4 minutes, small on toco  Interpretation: Reactive    Bedside ultrasound: Yes vertex    Labs in chart were reviewed.   Results from last 7 days   Lab Units 12/30/24 2137   WBC AUTO x10*3/uL 10.9   HEMOGLOBIN g/dL 11.9*   HEMATOCRIT % 33.9*   PLATELETS AUTO x10*3/uL 201        Prenatal labs reviewed, not remarkable.

## 2024-12-31 NOTE — PROGRESS NOTES
Intrapartum Progress Note  Subjective   Zoraida Martinez is a 29 y.o.  at 38w3d. ALENA: 2025, by Ultrasound.     Patient getting more uncomfortable with contractions. Anxious for birth. Would like anything to be done that can help speed up labor (besides CRB).     Objective   Last Vitals:  Temp Pulse Resp BP MAP Pulse Ox   36.8 °C (98.2 °F) 93 18 119/70   100 %     FHTs: 130 baseline, moderate variability, -accels, -decels  UC: hard to tell based on toco, q1-5?     Assessment   IUP at 38w3  FHTs Cat I  Hx of IUFD   anxiety  Pit at 10    Plan   -continue to increase pit per protocol  -continue to encourage position changes  -continue to monitor FHTs and VS closely   -reassess in 2-4 hrs or prn     JAYCOB Delacruz

## 2024-12-31 NOTE — PROGRESS NOTES
"Intrapartum Progress Note  Subjective   Zoraida Martinez is a 29 y.o.  at 38w3d. ALENA: 2025, by Ultrasound.     Patient resting in bed. Patient reports that her contractions are getting stronger, and that she feels like baby is \"moving down\".     Patient declines SVE and/or PO or PV miso. Patient is ok with starting pitocin.     Objective   Last Vitals:  Temp Pulse Resp BP MAP Pulse Ox   36.1 °C (97 °F) 92 18 117/77   99 %     Patient resting comfortably in bed  FHTs: 130 baseline, moderate variability, +accels, -decels,   UC: q2-3 min     Assessment   IUP at 38w3  FHTs Cat I  Hx of IUFD    Plan   -discussed with patient about options for: #1 PO miso, #2 PV miso, #3 SVE and then based on SVE discussion about IOL options #4 pitocin #5 CRB ; after long discussion about options/risks/benefits patient opts for #4 (pitocin only)  -continue to monitor FHTs and VS closely   -reassess in 2-4hrs or prn     JAYCOB Delacruz    "

## 2024-12-31 NOTE — SIGNIFICANT EVENT
"Labor Progress Note    Pt resting comfortably in bed. Not feeling any of her ctx on the monitor.     Vitals: /75   Pulse 108   Temp 36.7 °C (98.1 °F) (Oral)   Resp 16   Ht 1.727 m (5' 8\")   Wt 92 kg (202 lb 13.2 oz)   LMP  (LMP Unknown)   SpO2 98%   BMI 30.84 kg/m²     SVE: 1/50/-3  FHT: 135/mod/+accel/-decel  [place]: CTX q2 min    A/P:  IOL  Continue to monitor for cervical change  Discussed extensively with patient options for induction, will start vaginal cytotec and then assess in three hours for possible CRB   CEFM, currently Category I  Epidural as requested    Pregnancy Notables  Hx IUFD @ 23wga   FGR diagnosed @ 37wga   IVAN, MDD, ADHD, PTSD with Dr Jenkins on wellbutrin 200 BID and klonopin 0.5 PRN, discussed with patient cannot have benzos in the active phase of labor    Pap ASCUS + HRHPV with no high grade abnormalities on colpo   Fetus absent nasal bone on anatomy scan, declined genetics   GBS pos   Pulmonary valve stenosis s/p MFM consult with normal maternal echo and BNP no need for tele on L&D  Rh neg s/p rhogam     Juliana Miles MD PGY-1   OB/GYN   "

## 2024-12-31 NOTE — PROGRESS NOTES
Intrapartum Progress Note  Subjective   Zoraida Martinez is a 29 y.o.  at 38w3d. ALENA: 2025, by Ultrasound.     Patient now desires SVE before next step in IOL. Patient very disappointed about SVE after exam. She would like to start pitocin. She will consider epidural.     Objective   Last Vitals:  Temp Pulse Resp BP MAP Pulse Ox   36.1 °C (97 °F) 98 18 123/71   97 %     FHTs: 135 baseline, moderate variability, +accels, -decels  UC: q1-5 min   SVE: 1.5/50/-3    Assessment   IUP at 38w3  FHTs Cat I  Hx of IUFD  anxiety    Plan   -plan to start pitocin  -encouraged position changes as possible  -continue to monitor FHTs and VS closely   -reassess in 2-4hrs or prn     JAYCOB Delacruz

## 2025-01-01 PROCEDURE — 2500000001 HC RX 250 WO HCPCS SELF ADMINISTERED DRUGS (ALT 637 FOR MEDICARE OP)

## 2025-01-01 PROCEDURE — 7210000002 HC LABOR PER HOUR

## 2025-01-01 PROCEDURE — 7100000016 HC LABOR RECOVERY PER HOUR

## 2025-01-01 PROCEDURE — 59410 OBSTETRICAL CARE: CPT

## 2025-01-01 PROCEDURE — 88307 TISSUE EXAM BY PATHOLOGIST: CPT | Mod: TC,SUR

## 2025-01-01 PROCEDURE — 2500000001 HC RX 250 WO HCPCS SELF ADMINISTERED DRUGS (ALT 637 FOR MEDICARE OP): Performed by: OBSTETRICS & GYNECOLOGY

## 2025-01-01 PROCEDURE — 1100000001 HC PRIVATE ROOM DAILY

## 2025-01-01 PROCEDURE — 2500000004 HC RX 250 GENERAL PHARMACY W/ HCPCS (ALT 636 FOR OP/ED): Performed by: STUDENT IN AN ORGANIZED HEALTH CARE EDUCATION/TRAINING PROGRAM

## 2025-01-01 PROCEDURE — 2500000001 HC RX 250 WO HCPCS SELF ADMINISTERED DRUGS (ALT 637 FOR MEDICARE OP): Performed by: STUDENT IN AN ORGANIZED HEALTH CARE EDUCATION/TRAINING PROGRAM

## 2025-01-01 RX ORDER — CALCIUM CARBONATE 200(500)MG
500 TABLET,CHEWABLE ORAL DAILY
Status: DISCONTINUED | OUTPATIENT
Start: 2025-01-01 | End: 2025-01-04 | Stop reason: HOSPADM

## 2025-01-01 RX ORDER — ONDANSETRON HYDROCHLORIDE 2 MG/ML
4 INJECTION, SOLUTION INTRAVENOUS EVERY 6 HOURS PRN
Status: DISCONTINUED | OUTPATIENT
Start: 2025-01-01 | End: 2025-01-04 | Stop reason: HOSPADM

## 2025-01-01 RX ORDER — CALCIUM CARBONATE 200(500)MG
500 TABLET,CHEWABLE ORAL DAILY
Status: DISCONTINUED | OUTPATIENT
Start: 2025-01-01 | End: 2025-01-01

## 2025-01-01 RX ORDER — LABETALOL HYDROCHLORIDE 5 MG/ML
20 INJECTION, SOLUTION INTRAVENOUS ONCE AS NEEDED
Status: DISCONTINUED | OUTPATIENT
Start: 2025-01-01 | End: 2025-01-04 | Stop reason: HOSPADM

## 2025-01-01 RX ORDER — FAMOTIDINE 10 MG/ML
20 INJECTION INTRAVENOUS ONCE
Status: COMPLETED | OUTPATIENT
Start: 2025-01-01 | End: 2025-01-01

## 2025-01-01 RX ORDER — DIPHENHYDRAMINE HYDROCHLORIDE 50 MG/ML
25 INJECTION INTRAMUSCULAR; INTRAVENOUS EVERY 6 HOURS PRN
Status: DISCONTINUED | OUTPATIENT
Start: 2025-01-01 | End: 2025-01-04 | Stop reason: HOSPADM

## 2025-01-01 RX ORDER — HYDROCORTISONE 1 %
CREAM (GRAM) TOPICAL 2 TIMES DAILY
Status: DISCONTINUED | OUTPATIENT
Start: 2025-01-01 | End: 2025-01-04 | Stop reason: HOSPADM

## 2025-01-01 RX ORDER — POLYETHYLENE GLYCOL 3350 17 G/17G
17 POWDER, FOR SOLUTION ORAL 2 TIMES DAILY PRN
Status: DISCONTINUED | OUTPATIENT
Start: 2025-01-01 | End: 2025-01-04 | Stop reason: HOSPADM

## 2025-01-01 RX ORDER — METHYLERGONOVINE MALEATE 0.2 MG/ML
0.2 INJECTION INTRAVENOUS ONCE AS NEEDED
Status: DISCONTINUED | OUTPATIENT
Start: 2025-01-01 | End: 2025-01-04 | Stop reason: HOSPADM

## 2025-01-01 RX ORDER — MISOPROSTOL 200 UG/1
800 TABLET ORAL ONCE AS NEEDED
Status: DISCONTINUED | OUTPATIENT
Start: 2025-01-01 | End: 2025-01-04 | Stop reason: HOSPADM

## 2025-01-01 RX ORDER — BISACODYL 10 MG/1
10 SUPPOSITORY RECTAL DAILY PRN
Status: DISCONTINUED | OUTPATIENT
Start: 2025-01-01 | End: 2025-01-04 | Stop reason: HOSPADM

## 2025-01-01 RX ORDER — SIMETHICONE 80 MG
80 TABLET,CHEWABLE ORAL 4 TIMES DAILY PRN
Status: DISCONTINUED | OUTPATIENT
Start: 2025-01-01 | End: 2025-01-04 | Stop reason: HOSPADM

## 2025-01-01 RX ORDER — NIFEDIPINE 10 MG/1
10 CAPSULE ORAL ONCE AS NEEDED
Status: DISCONTINUED | OUTPATIENT
Start: 2025-01-01 | End: 2025-01-04 | Stop reason: HOSPADM

## 2025-01-01 RX ORDER — ONDANSETRON 4 MG/1
4 TABLET, FILM COATED ORAL EVERY 6 HOURS PRN
Status: DISCONTINUED | OUTPATIENT
Start: 2025-01-01 | End: 2025-01-04 | Stop reason: HOSPADM

## 2025-01-01 RX ORDER — MINERAL OIL
OIL (ML) ORAL
Status: DISPENSED
Start: 2025-01-01 | End: 2025-01-01

## 2025-01-01 RX ORDER — ADHESIVE BANDAGE
10 BANDAGE TOPICAL
Status: DISCONTINUED | OUTPATIENT
Start: 2025-01-01 | End: 2025-01-04 | Stop reason: HOSPADM

## 2025-01-01 RX ORDER — OXYTOCIN 10 [USP'U]/ML
10 INJECTION, SOLUTION INTRAMUSCULAR; INTRAVENOUS ONCE AS NEEDED
Status: DISCONTINUED | OUTPATIENT
Start: 2025-01-01 | End: 2025-01-04 | Stop reason: HOSPADM

## 2025-01-01 RX ORDER — TRANEXAMIC ACID 100 MG/ML
1000 INJECTION, SOLUTION INTRAVENOUS ONCE AS NEEDED
Status: ACTIVE | OUTPATIENT
Start: 2025-01-01 | End: 2025-01-04

## 2025-01-01 RX ORDER — CLONAZEPAM 0.5 MG/1
1 TABLET ORAL DAILY PRN
Status: DISCONTINUED | OUTPATIENT
Start: 2025-01-01 | End: 2025-01-04 | Stop reason: HOSPADM

## 2025-01-01 RX ORDER — OXYTOCIN/0.9 % SODIUM CHLORIDE 30/500 ML
60 PLASTIC BAG, INJECTION (ML) INTRAVENOUS ONCE AS NEEDED
Status: DISCONTINUED | OUTPATIENT
Start: 2025-01-01 | End: 2025-01-04 | Stop reason: HOSPADM

## 2025-01-01 RX ORDER — DIPHENHYDRAMINE HCL 25 MG
25 CAPSULE ORAL EVERY 6 HOURS PRN
Status: DISCONTINUED | OUTPATIENT
Start: 2025-01-01 | End: 2025-01-04 | Stop reason: HOSPADM

## 2025-01-01 RX ORDER — IBUPROFEN 600 MG/1
600 TABLET ORAL EVERY 6 HOURS
Status: DISCONTINUED | OUTPATIENT
Start: 2025-01-01 | End: 2025-01-04 | Stop reason: HOSPADM

## 2025-01-01 RX ORDER — LOPERAMIDE HYDROCHLORIDE 2 MG/1
4 CAPSULE ORAL EVERY 2 HOUR PRN
Status: DISCONTINUED | OUTPATIENT
Start: 2025-01-01 | End: 2025-01-04 | Stop reason: HOSPADM

## 2025-01-01 RX ORDER — HYDRALAZINE HYDROCHLORIDE 20 MG/ML
5 INJECTION INTRAMUSCULAR; INTRAVENOUS ONCE AS NEEDED
Status: DISCONTINUED | OUTPATIENT
Start: 2025-01-01 | End: 2025-01-04 | Stop reason: HOSPADM

## 2025-01-01 RX ORDER — CARBOPROST TROMETHAMINE 250 UG/ML
250 INJECTION, SOLUTION INTRAMUSCULAR ONCE AS NEEDED
Status: DISCONTINUED | OUTPATIENT
Start: 2025-01-01 | End: 2025-01-04 | Stop reason: HOSPADM

## 2025-01-01 RX ORDER — ACETAMINOPHEN 325 MG/1
975 TABLET ORAL EVERY 6 HOURS
Status: DISCONTINUED | OUTPATIENT
Start: 2025-01-01 | End: 2025-01-04 | Stop reason: HOSPADM

## 2025-01-01 RX ORDER — LIDOCAINE 560 MG/1
1 PATCH PERCUTANEOUS; TOPICAL; TRANSDERMAL
Status: DISCONTINUED | OUTPATIENT
Start: 2025-01-01 | End: 2025-01-04 | Stop reason: HOSPADM

## 2025-01-01 RX ADMIN — IBUPROFEN 600 MG: 600 TABLET, FILM COATED ORAL at 17:01

## 2025-01-01 RX ADMIN — ACETAMINOPHEN 975 MG: 325 TABLET ORAL at 11:36

## 2025-01-01 RX ADMIN — FAMOTIDINE 20 MG: 10 INJECTION INTRAVENOUS at 02:31

## 2025-01-01 RX ADMIN — BUPROPION HYDROCHLORIDE 200 MG: 100 TABLET, FILM COATED, EXTENDED RELEASE ORAL at 15:22

## 2025-01-01 RX ADMIN — ACETAMINOPHEN 975 MG: 325 TABLET ORAL at 17:01

## 2025-01-01 RX ADMIN — IBUPROFEN 600 MG: 600 TABLET, FILM COATED ORAL at 23:26

## 2025-01-01 RX ADMIN — ACETAMINOPHEN 975 MG: 325 TABLET ORAL at 05:15

## 2025-01-01 RX ADMIN — IBUPROFEN 600 MG: 600 TABLET, FILM COATED ORAL at 11:36

## 2025-01-01 RX ADMIN — BUPROPION HYDROCHLORIDE 200 MG: 100 TABLET, FILM COATED, EXTENDED RELEASE ORAL at 22:41

## 2025-01-01 RX ADMIN — ACETAMINOPHEN 975 MG: 325 TABLET ORAL at 23:26

## 2025-01-01 RX ADMIN — CALCIUM CARBONATE (ANTACID) CHEW TAB 500 MG 500 MG: 500 CHEW TAB at 02:45

## 2025-01-01 RX ADMIN — ONDANSETRON 4 MG: 2 INJECTION INTRAMUSCULAR; INTRAVENOUS at 01:32

## 2025-01-01 RX ADMIN — IBUPROFEN 600 MG: 600 TABLET, FILM COATED ORAL at 05:15

## 2025-01-01 RX ADMIN — CLONAZEPAM 1 MG: 0.5 TABLET ORAL at 15:47

## 2025-01-01 ASSESSMENT — PAIN SCALES - GENERAL
PAINLEVEL_OUTOF10: 0 - NO PAIN
PAINLEVEL_OUTOF10: 2
PAINLEVEL_OUTOF10: 0 - NO PAIN
PAINLEVEL_OUTOF10: 2
PAINLEVEL_OUTOF10: 0 - NO PAIN
PAINLEVEL_OUTOF10: 0 - NO PAIN
PAINLEVEL_OUTOF10: 2
PAINLEVEL_OUTOF10: 0 - NO PAIN

## 2025-01-01 ASSESSMENT — PAIN DESCRIPTION - LOCATION
LOCATION: ABDOMEN
LOCATION: PERINEUM

## 2025-01-01 NOTE — CARE PLAN
The patient's goals for the shift include bond with baby and get rest    The clinical goals for the shift include maintain stable vital signs    Over the shift, the patient did make progress toward the following goals.

## 2025-01-01 NOTE — L&D DELIVERY NOTE
OB Delivery Note  2025  Zoraida Michelle  29 y.o.   Vaginal, Spontaneous   29 y.o.  underwent induction in the s/o FGR diagnosed at 37wga and hx IUFD and delivered at 38w4d.     Uncomplicated . Postpartum pitocin bolus initiated. Vigorous infant placed on maternal abdomen for skin to skin. Delayed cord clamping. Placenta delivered spontaneously and intact with gentle traction on umbilical cord. Fundus palpated firm, midline, and at umbilicus. Vagina, perineum, and labia inspected. First degree perineal, R periurethral and bilateral labial lacerations noted to be hemostatic and did not require repair. Dr Berry was present for the entire delivery.     Gestational Age: 38w4d  /Para:   Quantitative Blood Loss: Admission to Discharge: 156 mL (2024  8:10 PM - 2025  6:36 AM)    Garrick Martinez [[id number]]      Labor Events    Sac identifier: Sac 1  Rupture type: Spontaneous  Fluid color: Clear  Fluid odor: None  Labor type: Induced Onset of Labor  Labor allowed to proceed with plans for an attempted vaginal birth?: Yes  Induction indications: Fetal Abnormality  Complications: None       Placenta    Placenta delivery date/time: 2025 0325  Placenta removal: Spontaneous  Placenta appearance: Intact  Placenta disposition: pathology       Cord    Vessels: 3 vessels  Complications: None  Delayed cord clamping?: Yes  Cord clamped date/time: 2025 03:24:00       Lacerations    Episiotomy: None  Perineal laceration: 1st  Perineal laceration repaired?: No  Periurethral laceration?: Yes  Periurethral laceration location: right  Periurethral laceration repaired?: No  Labial laceration?: Yes  Labial laceration location: bilateral  Labial laceration repaired?: No  Repair suture: None       Anesthesia    Method: Epidural       Operative Delivery    Forceps attempted?: No  Vacuum extractor attempted?: No       Shoulder Dystocia    Shoulder dystocia present?: No       [place] Delivery     Time head delivered: 2025 03:24:00  Birth date/time: 2025 03:24:00  Delivery type: Vaginal, Spontaneous  Complications: None       Resuscitation    Method: Suctioning, Tactile stimulation       Apgars    Living status: Living  Apgar Component Scores:  1 min.:  5 min.:  10 min.:  15 min.:  20 min.:    Skin color:  1  1       Heart rate:  2  2       Reflex irritability:  2  2       Muscle tone:  2  2       Respiratory effort:  2  2       Total:  9  9       Apgars assigned by: MARKELL JACOME       Delivery Providers    Delivering clinician: Janice Berry MD   Provider Role    Breanna Montemayor, RN Delivery Nurse    Jose Winchester, RN Nursery Nurse    Meche Miles MD Resident               Juliana Miles MD PGY-1

## 2025-01-01 NOTE — SIGNIFICANT EVENT
"Labor Progress Note    Pt resting comfortably in bed.    Vitals: BP 99/66   Pulse 96   Temp 36.8 °C (98.2 °F) (Oral)   Resp 14   Ht 1.727 m (5' 8\")   Wt 92 kg (202 lb 13.2 oz)   LMP  (LMP Unknown)   SpO2 100%   BMI 30.84 kg/m²     SVE: 6/80/-1  FHT: 125/mod/-accel/+late decel  [place]: CTX q1-3 min    A/P:  IOL  Continue to monitor for cervical change  CEFM, currently Category II for late decel and minimal variability, reassured by recent moderate variability and will continue to monitor closely, pitocin stopped and unlikely to be restarted   Epidural infusing    Pregnancy Notables  Hx IUFD @ 23wga   FGR diagnosed @ 37wga   IVAN, MDD, ADHD, PTSD with Dr Jenkins on wellbutrin 200 BID and klonopin 0.5 PRN, discussed with patient cannot have benzos in the active phase of labor    Pap ASCUS + HRHPV with no high grade abnormalities on colpo   Fetus absent nasal bone on anatomy scan, declined genetics   GBS pos   Pulmonary valve stenosis s/p MFM consult with normal maternal echo and BNP no need for tele on L&D  Rh neg s/p rhogam     Juliana Miles MD PGY-1   OB/GYN   "

## 2025-01-01 NOTE — LACTATION NOTE
Lactation Consultant Note  Lactation Consultation       Maternal Information       Maternal Assessment       Infant Assessment       Feeding Assessment       LATCH TOOL       Breast Pump       Other OB Lactation Tools       Patient Follow-up       Other OB Lactation Documentation       Recommendations/Summary  I did not view a latch with this visit, infant swaddled and sleeping at this time. Mother reported infant had recently . Mother stated she feels breastfeeding is going well, mother stated she is able to express colostrum and stated infant's latch has been primarily comfortable.     I educated mother on typical  feeding cues, and typical  feeding patterns in the first 24 hours of life. I encouraged mother to feed infant on demand based on infant feeding cues, and if it has been three hours since last feed I encouraged mother to place infant skin to skin. We discussed a variety of breastfeeding topics including benefits of skin to skin, characteristics and benefits of a deep and proper latch, expected output on different days of life, and using breast compression/massage and stimulation techniques to keep infant actively feeding at the breast.     Mother has a pump for home, reviewed outpatient lactation resources available to her, encouraged to call for any questions and for a latch observation by lactation with a feeding.

## 2025-01-01 NOTE — SIGNIFICANT EVENT
To bedside for prolonged decel.    Patient feeling shaky, nauseous, weak. Very dense epidural, cannot move legs or feel anything in pelvis    SVE (Dr. Miles): 8-9/80/0  /min-mod/+accel/+recurrent variable and prolonged decelerations  [place]: q1-2min    Pitocin paused, fluid bolus initiated, maternal repositioning with recovery of FHT  FHT now Cat II with 120/min-mod/-accel/+small variable decels  Discussed with patient that due to FHT, may not be able to turn pitocin back on. Offered CS at this time, patient strongly desires to avoid CS but is amenable if needed. Given rapid cervical change and improvement in FHT, will cautiously continue with IOL. Discussed that if FHT had another prolonged deceleration and patient was not actively pushing, would strongly recommend CS. Patient expressed understanding  Will plan to recheck cervix in 30min-1hr  Anesthesia paused epidural    Seen and discussed with Dr. Jamal Hernadez MD  PGY-4, Obstetrics and Gynecology

## 2025-01-01 NOTE — SIGNIFICANT EVENT
"Labor Progress Note    Pt resting comfortably in bed.    Vitals: /65   Pulse 76   Temp 37.2 °C (99 °F) (Oral)   Resp 16   Ht 1.727 m (5' 8\")   Wt 92 kg (202 lb 13.2 oz)   LMP  (LMP Unknown)   SpO2 98%   BMI 30.84 kg/m²     SVE: 3/80/-1  FHT: 130/min/+accel/+late decel  [place]: CTX q3-5 min    A/P:  IOL  Continue to monitor for cervical change  Continue pitocin per protocol, currently at 12  CEFM, currently Category II for late decel and minimal variability but reassured by accel  Epidural infusing    Pregnancy Notables  Hx IUFD @ 23wga   FGR diagnosed @ 37wga   IVAN, MDD, ADHD, PTSD with Dr Jenkins on wellbutrin 200 BID and klonopin 0.5 PRN, discussed with patient cannot have benzos in the active phase of labor    Pap ASCUS + HRHPV with no high grade abnormalities on colpo   Fetus absent nasal bone on anatomy scan, declined genetics   GBS pos   Pulmonary valve stenosis s/p MFM consult with normal maternal echo and BNP no need for tele on L&D  Rh neg s/p rhogam     Juliana Miles MD PGY-1   OB/GYN   "

## 2025-01-01 NOTE — SIGNIFICANT EVENT
To bedside to discuss overall labor course.     Pitocin just turned off for first time, Cat II tracing with baseline 125/min variability/no accels/prolonged 4 min deceleration with recovery to baseline. Tracing now with pitocin off Category I. Latent labor, most recently 3/80/-1, s/p SROM.    Reviewed with patient that although tracing is currently reassuring, if we are unable to turn pitocin back on due to decelerations, would recommend CS. Reviewed that we are not currently at that point and we would like to continue IOL and  is preferred. Will continue to monitor tracing closely.     Shirley Hernadez MD  PGY-4, Obstetrics and Gynecology

## 2025-01-02 PROCEDURE — 1100000001 HC PRIVATE ROOM DAILY

## 2025-01-02 PROCEDURE — 2500000001 HC RX 250 WO HCPCS SELF ADMINISTERED DRUGS (ALT 637 FOR MEDICARE OP)

## 2025-01-02 PROCEDURE — 2500000005 HC RX 250 GENERAL PHARMACY W/O HCPCS

## 2025-01-02 PROCEDURE — 2500000001 HC RX 250 WO HCPCS SELF ADMINISTERED DRUGS (ALT 637 FOR MEDICARE OP): Performed by: STUDENT IN AN ORGANIZED HEALTH CARE EDUCATION/TRAINING PROGRAM

## 2025-01-02 PROCEDURE — 2500000001 HC RX 250 WO HCPCS SELF ADMINISTERED DRUGS (ALT 637 FOR MEDICARE OP): Performed by: FAMILY MEDICINE

## 2025-01-02 RX ORDER — IBUPROFEN 600 MG/1
600 TABLET ORAL EVERY 6 HOURS
Qty: 120 TABLET | Refills: 0 | Status: SHIPPED | OUTPATIENT
Start: 2025-01-02 | End: 2025-02-01

## 2025-01-02 RX ORDER — POLYETHYLENE GLYCOL 3350 17 G/17G
17 POWDER, FOR SOLUTION ORAL DAILY
Qty: 30 PACKET | Refills: 0 | Status: SHIPPED | OUTPATIENT
Start: 2025-01-02 | End: 2025-02-01

## 2025-01-02 RX ORDER — ACETAMINOPHEN 325 MG/1
975 TABLET ORAL EVERY 6 HOURS
Qty: 360 TABLET | Refills: 0 | Status: SHIPPED | OUTPATIENT
Start: 2025-01-02 | End: 2025-02-01

## 2025-01-02 RX ORDER — CLONAZEPAM 0.5 MG/1
0.5 TABLET ORAL ONCE AS NEEDED
Status: COMPLETED | OUTPATIENT
Start: 2025-01-02 | End: 2025-01-02

## 2025-01-02 RX ADMIN — HYDROCORTISONE 1 APPLICATION: 1 CREAM TOPICAL at 04:38

## 2025-01-02 RX ADMIN — IBUPROFEN 600 MG: 600 TABLET, FILM COATED ORAL at 18:05

## 2025-01-02 RX ADMIN — CLONAZEPAM 0.5 MG: 0.5 TABLET ORAL at 22:24

## 2025-01-02 RX ADMIN — BUPROPION HYDROCHLORIDE 200 MG: 100 TABLET, FILM COATED, EXTENDED RELEASE ORAL at 22:25

## 2025-01-02 RX ADMIN — BUPROPION HYDROCHLORIDE 200 MG: 100 TABLET, FILM COATED, EXTENDED RELEASE ORAL at 08:45

## 2025-01-02 RX ADMIN — ACETAMINOPHEN 975 MG: 325 TABLET ORAL at 18:05

## 2025-01-02 RX ADMIN — CLONAZEPAM 0.5 MG: 0.5 TABLET ORAL at 16:06

## 2025-01-02 RX ADMIN — ACETAMINOPHEN 975 MG: 325 TABLET ORAL at 12:12

## 2025-01-02 RX ADMIN — HYDROCORTISONE: 1 CREAM TOPICAL at 15:42

## 2025-01-02 RX ADMIN — IBUPROFEN 600 MG: 600 TABLET, FILM COATED ORAL at 05:37

## 2025-01-02 RX ADMIN — IBUPROFEN 600 MG: 600 TABLET, FILM COATED ORAL at 12:12

## 2025-01-02 RX ADMIN — HYDROCORTISONE: 1 CREAM TOPICAL at 22:24

## 2025-01-02 RX ADMIN — ACETAMINOPHEN 975 MG: 325 TABLET ORAL at 05:36

## 2025-01-02 ASSESSMENT — PAIN DESCRIPTION - DESCRIPTORS
DESCRIPTORS: CRAMPING

## 2025-01-02 ASSESSMENT — PAIN SCALES - GENERAL
PAINLEVEL_OUTOF10: 2
PAINLEVEL_OUTOF10: 2
PAINLEVEL_OUTOF10: 3
PAINLEVEL_OUTOF10: 2

## 2025-01-02 NOTE — ANESTHESIA POSTPROCEDURE EVALUATION
Patient: Zoraida Martinez    Procedure Summary       Date: 24 Room / Location:     Anesthesia Start:  Anesthesia Stop: 25    Procedure: Labor Analgesia Diagnosis:     Scheduled Providers:  Responsible Provider: Joseph Loja MD    Anesthesia Type: epidural ASA Status: 2        Zoraida Martinez is a 29 y.o., , who had a Vaginal, Spontaneous delivery on 2025 at 38w5d and is now POD.    She had Neuraxial Anesthesia without immediate complications noted.       Pain well controlled    Vitals:    25 0415   BP: 126/82   Pulse: 75   Resp: 16   Temp: 36.7 °C (98.1 °F)   SpO2: 96%       Neuraxial site assessed. No visible redness or swelling or drainage. Patient able to ambulate and move all extremities without difficulty. Able to void. No complaints of nausea/vomiting. Tolerating PO intake well. No s/sx of PDPH.     Anesthesia will sign off     CALVIN Bernstein      Anesthesia Type: epidural      Anesthesia Post Evaluation    Patient location during evaluation: bedside  Patient participation: complete - patient participated  Level of consciousness: awake  Pain management: adequate  Airway patency: patent  Cardiovascular status: acceptable  Respiratory status: acceptable  Hydration status: acceptable  Postoperative Nausea and Vomiting: none        No notable events documented.    
,
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
## 2025-01-02 NOTE — LACTATION NOTE
Lactation Consultant Note  Lactation Consultation  Reason for Consult: Follow-up assessment  Consultant Name: Casandra Coffman RN IBCLC    Maternal Information  Has mother  before?: No    Maternal Assessment  Breast Assessment: Medium, Symmetrical, Soft, Compressible (mother states expressible)  Nipple Assessment: Intact, Erect  Areola Assessment: Normal    Infant Assessment  Infant Behavior: Quiet alert  Infant Assessment: Tongue protrudes over alveolar ridge, Good lateral movement of tongue, Good cupping of tongue    Feeding Assessment  Nutrition Source: Breastmilk  Feeding Method: Nursing at the breast  Feeding Position: Football/seated, Cross - cradle, Mother demonstrates good positioning  Suck/Feeding: Sustained, Tactile stimulation needed  Latch Assessment: Deep latch obtained, Instructed on deep latch, Eagerly grasped on to latch, Comfortable with no pain, Sucks with long jaw movement    LATCH TOOL  Latch: Grasps breast, tongue down, lips flanged, rhythmic sucking  Audible Swallowing: A few with stimulation  Type of Nipple: Everted (After stimulation)  Comfort (Breast/Nipple): Soft/non-tender  Hold (Positioning): Minimal assist, teach one side, mother does other, staff holds  LATCH Score: 8    Breast Pump       Other OB Lactation Tools       Patient Follow-up  Inpatient Lactation Follow-up Needed : Yes  Outpatient Lactation Follow-up: Recommended    Other OB Lactation Documentation  Maternal Risk Factors: Extreme tiredness, fatigue or stress  Infant Risk Factors: Early term birth 37-39 weeks    Recommendations/Summary  Mother stated she last tried to latch infant at 0630 but infant was not cooperating with eating. Infant ended up having a large bowel movement and mother is now going to try to feed infant again. I offered mother assistance with latching, mother agreeable. Mother stated latching infant to R breast had been more challenging than the L, however her nurse overnight showed her football hold and  that was more successful. Mother stated she used a manual pump to pump the R breast once when latching was difficult and she expressed 15ml breastmilk.     Mother positioned infant at the R breast using football hold with pillow support, mother demonstrated good positioning. Infant initially latched a little shallow and mother stated it felt pinchy. I demonstrated how to break infant's suction using my finger in the corner of her mouth. I assisted mother to relatch infant and demonstrated to mother to bring infant to the breast chin first and align infant's nose with mother's nipple. Infant latched well with areolar attachment, nose and chin touching breast, lips flanged, and sucks with long jaw movements. Mother stated latch was comfortable. Infant sleepy and requiring tactile stimulation to continue actively sucking at the breast. I reviewed these techniques with mother. When infant's sucks became non nutritive and infant was not sucking with long jaw movements with tactile stimulation, we unlatched infant. Mother's nipple rounded after feed. Infant brought to [facility] as RN had entered room for infant assessment. Infant showing feeding cues after assessment completed. I offered mother assistance latching infant to R breast, mother agreeable. I assisted mother to position infant skin to skin in a cross cradle hold at the R breast using pillow support, and assisted mother to latch infant. Infant latched well with areolar attachment, nose and chin touching breast, lips flanged, sucks with long jaw movement and some swallows observed. Infant more rhythmically leading feed on this breast. Mother stated latch was comfortable.     I educated mother on feeding infant based on feeding cues with a goal of 8-12 times in a 24 hour period, waking infant if it has been 3 hours since last feed, feeding infant on first breast until they unlatch or until tactile stimulation is not keeping them sucking/swallowing at breast. I then  recommended burping infant and then trying to latch/feed infant on other breast. I also encouraged mother to utilize tactile stimulation techniques and breast compression to keep infant actively feeding at the breast as needed. We discussed proper alignment of infant at the breast, characteristics and benefits of a deep and proper latch, and benefits of skin to skin.     All questions answered, mother has a pump for home and has been given outpatient lactation resources handout. Encouraged to call for assistance as needed.

## 2025-01-02 NOTE — PROGRESS NOTES
Postpartum Progress Note    Assessment/Plan     Zoraida Martinez is a 29 y.o., , who initially presented for IOL in s/o FGR.  She had a Vaginal, Spontaneous delivery on 2025 at 38w5d and is now PPD#1.        - continue routine care  - pain well controlled on ERAS protocol  - HMG 11.9 ->   - DVT Score: 3 SCDs  - Pt's blood type is A NEG. The baby's blood type is A NEG. Rhogam is not indicated.    Rh negative status  - s/p Rhogam 10/31    Contraception  declines bridge method and discussed pregnancy spacing of at least one year, abstaining from intercourse for 6wks, and the ability to become pregnant in the absence of regular menses. Pt verbalized understanding    Maternal Well-Being  - emotional support provided  - bonding with infant  -  feeding: breastfeeding/pumping encouraged; lactation consult prn   - pregnancy n/f   -- h/o IUFD in     Dispo  - anticipate d/c on PPD#2 if meeting all post-op and postpartum milestones    - On discharge, follow up with primary OB in:         - 4-6 weeks for post-partum visit     Assessment & Plan  Encounter for induction of labor      Subjective   Her pain is well controlled with current medications  She is passing flatus  She is ambulating independently  She is tolerating a Adult diet Regular  She is voiding spontaneously  She reports no breast or nursing problems  She denies emotional concerns today       Objective   Last Vitals:  Temp Pulse Resp BP MAP Pulse Ox   36.6 °C (97.8 °F) 59 16 98/58   97 %       Physical Exam:  General: well appearing, well nourished, postpartum  Obstetric: fundus firm below umbilicus, lochia light  Skin: Warm, dry; no rashes/lesions/erythema  Neuro: A/Ox3, no gross motor deficit   GI: no distension, appropriately tender, soft  Respiratory: Even and unlabored on RA  Cardiovascular: Trace BLE edema; No erythema, warmth  Psych: appropriate mood and affect      Lab Data:  Lab Results   Component Value Date    WBC 10.9  12/30/2024    HGB 11.9 (L) 12/30/2024    HCT 33.9 (L) 12/30/2024     12/30/2024

## 2025-01-03 PROCEDURE — 2500000001 HC RX 250 WO HCPCS SELF ADMINISTERED DRUGS (ALT 637 FOR MEDICARE OP): Performed by: STUDENT IN AN ORGANIZED HEALTH CARE EDUCATION/TRAINING PROGRAM

## 2025-01-03 PROCEDURE — 2500000001 HC RX 250 WO HCPCS SELF ADMINISTERED DRUGS (ALT 637 FOR MEDICARE OP)

## 2025-01-03 PROCEDURE — 1100000001 HC PRIVATE ROOM DAILY

## 2025-01-03 RX ADMIN — IBUPROFEN 600 MG: 600 TABLET, FILM COATED ORAL at 19:06

## 2025-01-03 RX ADMIN — ACETAMINOPHEN 975 MG: 325 TABLET ORAL at 06:55

## 2025-01-03 RX ADMIN — ACETAMINOPHEN 975 MG: 325 TABLET ORAL at 19:06

## 2025-01-03 RX ADMIN — ACETAMINOPHEN 975 MG: 325 TABLET ORAL at 12:18

## 2025-01-03 RX ADMIN — IBUPROFEN 600 MG: 600 TABLET, FILM COATED ORAL at 06:55

## 2025-01-03 RX ADMIN — BUPROPION HYDROCHLORIDE 200 MG: 100 TABLET, FILM COATED, EXTENDED RELEASE ORAL at 21:03

## 2025-01-03 RX ADMIN — BUPROPION HYDROCHLORIDE 200 MG: 100 TABLET, FILM COATED, EXTENDED RELEASE ORAL at 09:05

## 2025-01-03 RX ADMIN — CALCIUM CARBONATE (ANTACID) CHEW TAB 500 MG 500 MG: 500 CHEW TAB at 09:04

## 2025-01-03 RX ADMIN — IBUPROFEN 600 MG: 600 TABLET, FILM COATED ORAL at 00:33

## 2025-01-03 RX ADMIN — HYDROCORTISONE: 1 CREAM TOPICAL at 21:03

## 2025-01-03 RX ADMIN — CLONAZEPAM 1 MG: 0.5 TABLET ORAL at 13:51

## 2025-01-03 RX ADMIN — IBUPROFEN 600 MG: 600 TABLET, FILM COATED ORAL at 12:18

## 2025-01-03 RX ADMIN — HYDROCORTISONE: 1 CREAM TOPICAL at 09:05

## 2025-01-03 RX ADMIN — ACETAMINOPHEN 975 MG: 325 TABLET ORAL at 00:33

## 2025-01-03 ASSESSMENT — PAIN DESCRIPTION - DESCRIPTORS
DESCRIPTORS: CRAMPING

## 2025-01-03 ASSESSMENT — PAIN DESCRIPTION - LOCATION
LOCATION: ABDOMEN
LOCATION: ABDOMEN

## 2025-01-03 ASSESSMENT — PAIN SCALES - GENERAL
PAINLEVEL_OUTOF10: 1
PAINLEVEL_OUTOF10: 3
PAINLEVEL_OUTOF10: 2
PAINLEVEL_OUTOF10: 0 - NO PAIN
PAINLEVEL_OUTOF10: 0 - NO PAIN

## 2025-01-03 NOTE — PROGRESS NOTES
Postpartum Progress Note    Assessment/Plan   Zoraida Martinez is a 29 y.o., , who delivered at 38w6d gestation    Now PPD#2 s/p Vaginal, Spontaneous on 2025  - Continue routine postpartum care  - Pain well controlled on po medications  - DVT risk score DVT Score: 3 , ppx with SCDs and ambulation  - RH negative, rhogam not indicated- Infant A neg  -  cc  - Hgb:   Results from last 7 days   Lab Units 24  2137   HEMOGLOBIN g/dL 11.9*      IVAN, MDD, ADHD, PTSD  - follows with Dr Jenkins on wellbutrin 200 BID and klonopin PRN   - mood stable, denies SI/HI  - verbalizes anxiety about infant care  - SW consult placed    Hx of Pulmonary valve stenosis   - s/p MFM consult with normal maternal echo and BNP    Maternal Well-Being  - Vitals stable  - All questions and concerns address     Feeding  - Breastfeeding/pumping encouraged  - Lactation consult prn    Contraception  - Defers contraception to primary OB/PP visit. We discussed pregnancy spacing of at least one year, abstaining from intercourse for 6wks, and the ability to become pregnant in the absence of regular menses. Pt verbalized understanding.  - Encouraged to continue PNV    Dispo  - Anticipate d/c on PPD #2-3 if meeting all postpartum milestones  - Follow-up in 4-6wks with primary EMANUEL Mello-CNP     Assessment & Plan  Encounter for induction of labor    Pregnancy Problems (from 24 to present)       Problem Noted Diagnosed Resolved    Encounter for induction of labor 2024 by Shirley Hernadez MD  No    Priority:  Medium       Fetal growth restriction antepartum ([facility]) 2024 by Ashlee Cyr MD  No    Priority:  Medium       Overview Signed 2024 12:00 PM by Ashlee Cyr MD     Diagnosed at 37 weeks based on AC 5%tile, AC 22%tile  Repeat BPP on  and then IOL on          Supervision of high risk pregnancy in third trimester ([facility]) 2024 by Ashlee Cyr MD  No     Priority:  Medium       37 weeks gestation of pregnancy ([facility]) 2024 by Ashlee Cyr MD  No    Priority:  Medium       Overview Addendum 2024 11:59 AM by Ashlee Cyr MD     [x] Dating: by 10 week ultrasound (LMP unknown)  [x] Initial BMI: 29  [x] Prenatal Labs: reviewed, Rh negative, Rubella immune, Hgb 13.6  [x] Pap: 2024 ASCUS/+HR HPV, colpo performed and no high grade abnormalities suspected  [x] Aneuploidy Screening: normal first trimester screen  [x] Baby ASA: not indicated, one moderate risk factor  [x] Anatomy US:  absent nasal bone, declines genetic counseling/testing  [x] 1hr GCT at 24-28wks: normal at 81  [x] Tdap (27-36wks): Given 10/11/24  [x] Flu Shot: Given 10/11/24  [x] RSV vaccine: Done  [x] Rhogam (if Rh neg): given 10/31  [x] Third trimester growth: serial growth for history of IUFD, EFW at 34 weeks 37%tile  [x] GBS at 36 wks: positive, discussed antibiotics  [x] Breastfeeding: Plans to breastfeed, has wearable pump  [x] PPBC: Discussed, declines  [x] 39 weeks discussion of IOL vs. Expectant management: scheduled for IOL on   [x] Mode of delivery: Anticipate            Depression affecting pregnancy in first trimester, antepartum ([facility]) 2024 by Ashlee Cyr MD  No    Priority:  Medium       Overview Addendum 10/11/2024  1:48 PM by Ashlee Cyr MD     - previously diagnosed with anxiety, depression, ADHD, PTSD  - on wellbutin 200mg BID and klonipin PRN  - following with Dr. Jenkins         Rh negative state in antepartum period ([facility]) 2024 by Ashlee Cyr MD  No    Priority:  Medium       Overview Addendum 2024  1:33 PM by Ashlee Cyr MD     Rhogam given 10/31/24               Subjective     oZraida Martinez is PPD# s/p vaginal delivery who reports feeling overall well.  No acute events overnight.  Voiding spontaneously, passing flatus.  Pain well controlled on PO meds.  Light lochia. Tolerating diet.   "Denies HA, N/V, RUQ pain, vision changes, chest pain, or SOB. Denies dizziness/lightheadedness/LOC/uncontrolled bleeding. Encouraged ambulation and increased hydration.     Pt verbalizing much anxiety about infant and a \"choking\" incident this am. Pt lives alone and has limited help for when discharged. Much reassurance and emotional support offered. Will have peds evaluate infant before placing discharge order.    Objective   Allergies:   Patient has no known allergies.         Last Vitals:  Temp Pulse Resp BP MAP Pulse Ox   36.6 °C (97.9 °F) 70 16 113/72   97 %     Vitals Min/Max Last 24 Hours:  Temp  Min: 36.6 °C (97.9 °F)  Max: 37.1 °C (98.8 °F)  Pulse  Min: 62  Max: 88  Resp  Min: 16  Max: 18  BP  Min: 113/72  Max: 125/80    Intake/Output:   No intake or output data in the 24 hours ending 01/03/25 1042    Physical Exam:  General: examination reveals a well developed, well nourished, female, in no acute distress. She is alert and cooperative.  HEENT: external ears normal. Nose normal, no erythema or discharge.  Neck: supple, no significant adenopathy  Lungs: breathing even and unlabored, lungs clear all fields  Cardiac: warm and well perfused, heart rate regular, no murmur  Fundus: firm and below umbilicus, lochia light  Abdominal: soft, non-tender, non-distended, bowel sounds active  Extremities: no redness or tenderness in the calves or thighs, edema: non-pitting BLE  Neurological: alert, oriented, normal speech, no focal findings or movement disorder noted.  Psychological: awake and alert; oriented to person, place, and time.  Skin: no rashes or lesions    Lab Data:  0   Lab Value Date/Time    GRPBSTREP (A) 12/08/2024 1011     Isolated: Streptococcus agalactiae (Group B Streptococcus)     "

## 2025-01-03 NOTE — PROGRESS NOTES
"A new SW consult was placed following an event where Jonn had a gagging episode and turned blue. SWer stopped by to offer support and check-in.    Per Mom (Zoraida Martinez), Jonn starting gagging and had foam around her mouth/mouth turning blue. Mom got medical staff and staff proceeded to suction and pat Jonn on the back. Per medical staff, it was most likely Jonn trying to get amniotic fluid out of her lungs.    Mom reports increased anxiety as this event happened in the AM when she was sleeping and she \"only woke up because Jonn was crying.\" Has concerns regarding another event happening but Jonn does not cry.     SWer validated Mom's feelings as it was a scary event to see your baby turn blue and have medical staff performing various procedures. Offered practical advice like having her friend/cousin stay the first few nights at her apartment with her so she can feel like Jonn has a few sets of eyes on her. Additionally, talked through coping mechanisms that were discussed last visit (1/2/25).    Mom also had questions regarding parking pass as the [employer] is not showing the [employer] as an option. SWer suggested Mom have her cousins go down to the [employer]  and purchase the pass for her if they are able. Mom agreeable to this plan.    No additional needs at this time. Will remain available via secure chat.    Social Work will continue to remain available for any questions or concerns. Please feel free to reach out.      1/3/2025     ANN Mariano  Licensed Social Worker  Office Phone: [phone]        "

## 2025-01-03 NOTE — CARE PLAN
The patient's goals for the shift include rest    The clinical goals for the shift include maintain normal vital signs    Patient's vss, assessment stable, pain well controlled, lochia scant. Patient progressing well.      Problem: Pain - Adult  Goal: Verbalizes/displays adequate comfort level or baseline comfort level  Outcome: Progressing     Problem: Safety - Adult  Goal: Free from fall injury  Outcome: Progressing

## 2025-01-03 NOTE — LACTATION NOTE
Lactation Consultant Note  Lactation Consultation  Reason for Consult: Follow-up assessment  Consultant Name: Casandra Coffman RN IBCLC    Maternal Information       Maternal Assessment  Breast Assessment: Medium, Soft, Symmetrical, Compressible  Nipple Assessment: Intact, Erect, Red/inflamed, Sore (mother's R nipple sore and red)  Alterations in Nipple Condition: Stage I - pain or irritation with no skin break down  Areola Assessment: Normal    Infant Assessment  Infant Behavior: Crying  Infant Assessment:  (deferred)    Feeding Assessment  Nutrition Source: Breastmilk  Feeding Method: Nursing at the breast  Feeding Position: Cross - cradle, Skin to skin, Nipple to nose, Mother demonstrates good positioning, Mother needs assistance with latch/positioning, Nose lightly touching breast, Chin tucked into breast  Suck/Feeding: Sustained, Baby led rhythmically, Coordinated suck/swallow/breathe, Audible swallowing  Latch Assessment: Deep latch obtained, Areolar attachment, Instructed on deep latch, Minimal assistance is needed, Comfortable latch, Sucks with long jaw movement, Bursts of sucking, swallowing, and rest, Chin and lower lip contact breast first    LATCH TOOL  Latch: Grasps breast, tongue down, lips flanged, rhythmic sucking  Audible Swallowing: Spontaneous and intermittent (24 hours old)  Type of Nipple: Everted (After stimulation)  Comfort (Breast/Nipple): Soft/non-tender  Hold (Positioning): Minimal assist, teach one side, mother does other, staff holds  LATCH Score: 9    Breast Pump       Other OB Lactation Tools       Patient Follow-up       Other OB Lactation Documentation  Maternal Risk Factors: Extreme tiredness, fatigue or stress  Infant Risk Factors: Early term birth 37-39 weeks    Recommendations/Summary  Mother finishing changing infant's diaper when I entered the room, stated she was going to feed infant and would like help latching infant to R breast as that side has been painful with breastfeeding.  Mother's R nipple is reddened and sore. Mother stated she has been trying to feed infant in football hold on that side but struggles to latch infant well by herself. We discussed trying a cross cradle hold which is what mothers prefer to use on the L breast. Mother positioned infant skin to skin in a cross cradle hold at the R breast using pillow support, mother latching infant with R hand positioned on back of baby's head. Infant latched shallowly and painful for mother. I offered mother assistance, mother agreeable. I encouraged mother to use her R hand to support her R breast and position her L hand at the base of infant's neck. We brought infant to breast chin first with a wide open mouth, nipple aligned with infant's nose and infant latched well with areolar attachment, nose and chin touching breast, lips flanged, sucks with long jaw movement and audible swallows. Initially mother stated latch felt a little uncomfortable but after about 30 seconds mother stated latch was comfortable. I reviewed characteristics of a deep and proper latch, as well infant positioning and alignment at the breast. I provided mother with hydrogel pads and instructed her on their use. All questions answered, encouraged to call for assistance as needed.

## 2025-01-03 NOTE — CARE PLAN
The patient's goals for the shift include rest, bonding with     The clinical goals for the shift include adequate pain control, vitals WNL    VSS and assessments WNL, bleeding and swelling minimal, pain controlled with medications, breastfeeding and pumping. Discussed latching techniques to help infant get a deeper latch and reduce nipple pain.     Problem: Safety - Adult  Goal: Free from fall injury  Outcome: Progressing     Problem: Pain - Adult  Goal: Verbalizes/displays adequate comfort level or baseline comfort level  Outcome: Progressing

## 2025-01-04 VITALS
HEIGHT: 68 IN | WEIGHT: 202.82 LBS | OXYGEN SATURATION: 96 % | BODY MASS INDEX: 30.74 KG/M2 | RESPIRATION RATE: 16 BRPM | DIASTOLIC BLOOD PRESSURE: 72 MMHG | SYSTOLIC BLOOD PRESSURE: 111 MMHG | HEART RATE: 76 BPM | TEMPERATURE: 97.5 F

## 2025-01-04 PROCEDURE — 2500000001 HC RX 250 WO HCPCS SELF ADMINISTERED DRUGS (ALT 637 FOR MEDICARE OP)

## 2025-01-04 PROCEDURE — 2500000001 HC RX 250 WO HCPCS SELF ADMINISTERED DRUGS (ALT 637 FOR MEDICARE OP): Performed by: STUDENT IN AN ORGANIZED HEALTH CARE EDUCATION/TRAINING PROGRAM

## 2025-01-04 RX ADMIN — ACETAMINOPHEN 975 MG: 325 TABLET ORAL at 12:41

## 2025-01-04 RX ADMIN — BUPROPION HYDROCHLORIDE 200 MG: 100 TABLET, FILM COATED, EXTENDED RELEASE ORAL at 09:39

## 2025-01-04 RX ADMIN — IBUPROFEN 600 MG: 600 TABLET, FILM COATED ORAL at 06:51

## 2025-01-04 RX ADMIN — CLONAZEPAM 1 MG: 0.5 TABLET ORAL at 12:41

## 2025-01-04 RX ADMIN — IBUPROFEN 600 MG: 600 TABLET, FILM COATED ORAL at 12:41

## 2025-01-04 RX ADMIN — HYDROCORTISONE: 1 CREAM TOPICAL at 13:54

## 2025-01-04 RX ADMIN — ACETAMINOPHEN 975 MG: 325 TABLET ORAL at 06:52

## 2025-01-04 RX ADMIN — IBUPROFEN 600 MG: 600 TABLET, FILM COATED ORAL at 00:42

## 2025-01-04 RX ADMIN — ACETAMINOPHEN 975 MG: 325 TABLET ORAL at 00:42

## 2025-01-04 ASSESSMENT — PAIN SCALES - GENERAL
PAINLEVEL_OUTOF10: 4
PAINLEVEL_OUTOF10: 0 - NO PAIN

## 2025-01-04 NOTE — CARE PLAN
The patient's goals for the shift include bond with baby    The clinical goals for the shift include adequate pain control    Patient ambulating and voiding with no complaints. Assessment and vitals per flowsheet. Pain controlled with PO medications per MAR. Patient has no unanswered questions or concerns at this time.

## 2025-01-04 NOTE — DISCHARGE SUMMARY
"Discharge Summary    Zoraida Martinez is a 29 y.o. year old female , who delivered at 38w4d gestation via Vaginal, Spontaneous after IOL in s/o FGR, now PPD#3.    Admission Date: 2024  Discharge Date: 25       Discharge Diagnosis  Vaginal, Spontaneous    Hospital Course  Delivery Date: 2025 3:24 AM  Delivery type: Vaginal, Spontaneous   GA at delivery: 38w4d   Outcome: Living  Intrapartum complications: None  Feeding method: Breastfeeding Status: Yes     IVAN, MDD, ADHD, PTSD on wellbutrin and klonopin PRN, managed by Dr. Jenkins. Patient will schedule follow-up appointment.    Rh negative status with Rh negative infant, rhogam not indicated.    Procedures: none  Contraception at discharge: Declines. We discussed pregnancy spacing of at least one year, abstaining from intercourse for 6wks, and the ability to become pregnant in the absence of regular menses. Pt verbalized understanding.      Meeting all postpartum milestones. OK for DC today and follow up as below.   - Follow-up in 4-6wks with primary OGYN    Pertinent Physical Exam At Time of Discharge    General: Examination reveals a well developed, well nourished, female, in no acute distress. She is alert and cooperative.  Lungs: symmetrical, non-labored breathing.  Cardiac: warm, well-perfused.  Abdomen: soft, non-tender.  Fundus: firm, below umbilicus, and nontender.  Extremities: no redness or tenderness in the calves or thighs.  Neurological: alert, oriented, normal speech, no focal findings or movement disorder noted.     Vitals  /72 (BP Location: Left arm, Patient Position: Sitting)   Pulse 76   Temp 36.4 °C (97.5 °F) (Temporal)   Resp 16   Ht 1.727 m (5' 8\")   Wt 92 kg (202 lb 13.2 oz)   LMP  (LMP Unknown)   SpO2 96%   Breastfeeding Yes   BMI 30.84 kg/m²      Discharge Meds     Your medication list        START taking these medications        Instructions Last Dose Given Next Dose Due   acetaminophen 325 mg tablet  Commonly " known as: Tylenol      Take 3 tablets (975 mg) by mouth every 6 hours.       ibuprofen 600 mg tablet      Take 1 tablet (600 mg) by mouth every 6 hours.       polyethylene glycol 17 gram packet  Commonly known as: Glycolax, Miralax      Take 17 g by mouth once daily.              CONTINUE taking these medications        Instructions Last Dose Given Next Dose Due   buPROPion  mg 12 hr tablet  Commonly known as: Wellbutrin SR      Take 1 tablet (200 mg) by mouth 2 times a day.       cetirizine 10 mg tablet  Commonly known as: ZyrTEC      Take 1 tablet (10 mg) by mouth once daily.       clonazePAM 1 mg tablet  Commonly known as: KlonoPIN      Take 1 tablet (1 mg) by mouth once daily as needed for anxiety. TAKE 1 TABLET BY MOUTH ONCE DAILY AS NEEDED FOR ANXIETY       prenatal vit,calc76-iron-folic 29 mg iron- 1 mg tablet  Commonly known as: Prenatabs Rx                  ASK your doctor about these medications        Instructions Last Dose Given Next Dose Due   fluticasone 50 mcg/actuation nasal spray  Commonly known as: Flonase      Administer 1 spray into each nostril once daily. Shake gently. Before first use, prime pump. After use, clean tip and replace cap.                 Where to Get Your Medications        These medications were sent to [facility] [address] [place] [address] [address] [facility]  [address] [facility] [address]      Phone: [phone]   acetaminophen 325 mg tablet  ibuprofen 600 mg tablet  polyethylene glycol 17 gram packet          Complications Requiring Follow-Up  20    Test Results Pending At Discharge  Pending Labs       Order Current Status    Surgical Pathology Exam - PLACENTA In process            Outpatient Follow-Up    I spent 20 minutes in the professional and overall care of this patient.      Avelina Morris, APRN-CNP

## 2025-01-06 LAB
LABORATORY COMMENT REPORT: NORMAL
PATH REPORT.FINAL DX SPEC: NORMAL
PATH REPORT.GROSS SPEC: NORMAL
PATH REPORT.RELEVANT HX SPEC: NORMAL
PATH REPORT.TOTAL CANCER: NORMAL

## 2025-01-07 ENCOUNTER — TELEPHONE (OUTPATIENT)
Dept: OBSTETRICS AND GYNECOLOGY | Facility: CLINIC | Age: 30
End: 2025-01-07
Payer: MEDICARE

## 2025-01-07 ENCOUNTER — HOSPITAL ENCOUNTER (OUTPATIENT)
Facility: HOSPITAL | Age: 30
Discharge: HOME | End: 2025-01-07
Attending: SPECIALIST | Admitting: SPECIALIST
Payer: MEDICARE

## 2025-01-07 ENCOUNTER — HOSPITAL ENCOUNTER (OUTPATIENT)
Facility: HOSPITAL | Age: 30
End: 2025-01-07
Attending: SPECIALIST | Admitting: SPECIALIST
Payer: MEDICARE

## 2025-01-07 VITALS
SYSTOLIC BLOOD PRESSURE: 111 MMHG | RESPIRATION RATE: 16 BRPM | HEART RATE: 74 BPM | DIASTOLIC BLOOD PRESSURE: 69 MMHG | TEMPERATURE: 97.7 F | OXYGEN SATURATION: 100 %

## 2025-01-07 DIAGNOSIS — B95.1 GROUP BETA STREP POSITIVE: ICD-10-CM

## 2025-01-07 DIAGNOSIS — Z87.59 HISTORY OF IUFD: ICD-10-CM

## 2025-01-07 DIAGNOSIS — Z34.90 ENCOUNTER FOR INDUCTION OF LABOR: ICD-10-CM

## 2025-01-07 DIAGNOSIS — Z67.91 RH NEGATIVE STATE IN ANTEPARTUM PERIOD (HHS-HCC): ICD-10-CM

## 2025-01-07 DIAGNOSIS — F32.A DEPRESSION AFFECTING PREGNANCY IN FIRST TRIMESTER, ANTEPARTUM (HHS-HCC): ICD-10-CM

## 2025-01-07 DIAGNOSIS — O99.341 DEPRESSION AFFECTING PREGNANCY IN FIRST TRIMESTER, ANTEPARTUM (HHS-HCC): ICD-10-CM

## 2025-01-07 DIAGNOSIS — K59.01 SLOW TRANSIT CONSTIPATION: ICD-10-CM

## 2025-01-07 DIAGNOSIS — O09.93 SUPERVISION OF HIGH RISK PREGNANCY IN THIRD TRIMESTER (HHS-HCC): ICD-10-CM

## 2025-01-07 DIAGNOSIS — O26.899 RH NEGATIVE STATE IN ANTEPARTUM PERIOD (HHS-HCC): ICD-10-CM

## 2025-01-07 DIAGNOSIS — Z3A.37 37 WEEKS GESTATION OF PREGNANCY (HHS-HCC): ICD-10-CM

## 2025-01-07 DIAGNOSIS — F43.10 PTSD (POST-TRAUMATIC STRESS DISORDER): ICD-10-CM

## 2025-01-07 DIAGNOSIS — O36.5990 FETAL GROWTH RESTRICTION ANTEPARTUM (HHS-HCC): ICD-10-CM

## 2025-01-07 LAB
ALBUMIN SERPL BCP-MCNC: 4 G/DL (ref 3.4–5)
ALP SERPL-CCNC: 116 U/L (ref 33–110)
ALT SERPL W P-5'-P-CCNC: 17 U/L (ref 7–45)
ANION GAP SERPL CALC-SCNC: 13 MMOL/L (ref 10–20)
AST SERPL W P-5'-P-CCNC: 22 U/L (ref 9–39)
BILIRUB SERPL-MCNC: 0.3 MG/DL (ref 0–1.2)
BUN SERPL-MCNC: 8 MG/DL (ref 6–23)
CALCIUM SERPL-MCNC: 9.2 MG/DL (ref 8.6–10.6)
CHLORIDE SERPL-SCNC: 104 MMOL/L (ref 98–107)
CO2 SERPL-SCNC: 24 MMOL/L (ref 21–32)
CREAT SERPL-MCNC: 0.76 MG/DL (ref 0.5–1.05)
EGFRCR SERPLBLD CKD-EPI 2021: >90 ML/MIN/1.73M*2
ERYTHROCYTE [DISTWIDTH] IN BLOOD BY AUTOMATED COUNT: 12.4 % (ref 11.5–14.5)
GLUCOSE SERPL-MCNC: 70 MG/DL (ref 74–99)
HCT VFR BLD AUTO: 39.6 % (ref 36–46)
HGB BLD-MCNC: 13.3 G/DL (ref 12–16)
LDH SERPL L TO P-CCNC: 165 U/L (ref 84–246)
MCH RBC QN AUTO: 30.4 PG (ref 26–34)
MCHC RBC AUTO-ENTMCNC: 33.6 G/DL (ref 32–36)
MCV RBC AUTO: 90 FL (ref 80–100)
NRBC BLD-RTO: 0 /100 WBCS (ref 0–0)
PLATELET # BLD AUTO: 278 X10*3/UL (ref 150–450)
POTASSIUM SERPL-SCNC: 4.3 MMOL/L (ref 3.5–5.3)
PROT SERPL-MCNC: 7.2 G/DL (ref 6.4–8.2)
RBC # BLD AUTO: 4.38 X10*6/UL (ref 4–5.2)
SODIUM SERPL-SCNC: 137 MMOL/L (ref 136–145)
URATE SERPL-MCNC: 4.6 MG/DL (ref 2.3–6.7)
WBC # BLD AUTO: 7.4 X10*3/UL (ref 4.4–11.3)

## 2025-01-07 PROCEDURE — 84550 ASSAY OF BLOOD/URIC ACID: CPT

## 2025-01-07 PROCEDURE — 36415 COLL VENOUS BLD VENIPUNCTURE: CPT

## 2025-01-07 PROCEDURE — 99213 OFFICE O/P EST LOW 20 MIN: CPT

## 2025-01-07 PROCEDURE — 2500000001 HC RX 250 WO HCPCS SELF ADMINISTERED DRUGS (ALT 637 FOR MEDICARE OP)

## 2025-01-07 PROCEDURE — 80053 COMPREHEN METABOLIC PANEL: CPT

## 2025-01-07 PROCEDURE — 85027 COMPLETE CBC AUTOMATED: CPT

## 2025-01-07 PROCEDURE — 83615 LACTATE (LD) (LDH) ENZYME: CPT

## 2025-01-07 RX ORDER — POLYETHYLENE GLYCOL 3350 17 G/17G
17 POWDER, FOR SOLUTION ORAL 2 TIMES DAILY
Qty: 60 PACKET | Refills: 0 | Status: SHIPPED | OUTPATIENT
Start: 2025-01-07 | End: 2025-02-06

## 2025-01-07 RX ORDER — DOCUSATE SODIUM 100 MG/1
100 CAPSULE, LIQUID FILLED ORAL 2 TIMES DAILY PRN
Qty: 12 CAPSULE | Refills: 0 | Status: SHIPPED | OUTPATIENT
Start: 2025-01-07

## 2025-01-07 RX ORDER — ONDANSETRON 4 MG/1
4 TABLET, FILM COATED ORAL EVERY 6 HOURS PRN
Status: DISCONTINUED | OUTPATIENT
Start: 2025-01-07 | End: 2025-01-07 | Stop reason: HOSPADM

## 2025-01-07 RX ORDER — ONDANSETRON HYDROCHLORIDE 2 MG/ML
4 INJECTION, SOLUTION INTRAVENOUS EVERY 6 HOURS PRN
Status: DISCONTINUED | OUTPATIENT
Start: 2025-01-07 | End: 2025-01-07 | Stop reason: HOSPADM

## 2025-01-07 RX ORDER — NIFEDIPINE 10 MG/1
10 CAPSULE ORAL ONCE AS NEEDED
Status: DISCONTINUED | OUTPATIENT
Start: 2025-01-07 | End: 2025-01-07 | Stop reason: HOSPADM

## 2025-01-07 RX ORDER — METOCLOPRAMIDE 10 MG/1
10 TABLET ORAL ONCE
Status: COMPLETED | OUTPATIENT
Start: 2025-01-07 | End: 2025-01-07

## 2025-01-07 RX ORDER — HYDRALAZINE HYDROCHLORIDE 20 MG/ML
5 INJECTION INTRAMUSCULAR; INTRAVENOUS ONCE AS NEEDED
Status: DISCONTINUED | OUTPATIENT
Start: 2025-01-07 | End: 2025-01-07 | Stop reason: HOSPADM

## 2025-01-07 RX ORDER — LABETALOL HYDROCHLORIDE 5 MG/ML
20 INJECTION, SOLUTION INTRAVENOUS ONCE AS NEEDED
Status: DISCONTINUED | OUTPATIENT
Start: 2025-01-07 | End: 2025-01-07 | Stop reason: HOSPADM

## 2025-01-07 RX ADMIN — METOCLOPRAMIDE 10 MG: 10 TABLET ORAL at 16:22

## 2025-01-07 NOTE — H&P
25 4:27 PM  Obstetrical History and Physical - TRIAGE    History of Present Illness  Zoraida Martinez is a 29 y.o.  at 38w4d  with a working estimated date of delivery of 2025, by Ultrasound who presents to OB triage for a headache    Chief Complaint:  Headache        Assessment/Plan    Postpartum Headache  -Described as frontal consistent HA, that radiates to temple area, 4/10  -Normotensive, HELLP labs negative.  -Took 1,000mg of tylenol at home, Reglan given in triage, headache significantly improved  -Likely in setting of dehydration, discussed increased demand of hydration in with breast feeding, discussed importance of hydration and electrolyte replacement.   -Low concern for pre eclampsia at this time, given reassuring exam, much improved HA after treatment, likely in s/o lack of sleep, poor PO     Constipation  -Painful firm stools, script sent for alfredito lax and colace  -Discussed importance of hydration and fiber rich meals    Maternal Well-being  -Emotional support and reassurance provided  -Connected with Dr. Jenkins, taking Wellbutrin daily and clonidine as needed.  -Single mother, with no support at home, discussed pregnant with possibilities, and postpartum international support network, provided resources in triage.    Dispo:  -The patient is appropriate for discharge home.   -Maternal warning signs reviewed, and return precautions were reviewed.  -Follow up in 2 weeks with Dr. Cyr to OB triage sooner, PRN.        The above plan was discussed with Dr. Cyr who agreed patient is safe and clinically stable for discharge home.       Priyanka Espinoza, APRN-CNP, CLC   Obstetrics & Gynecology    Subjective   Zoraida Martinez presents to OB triage with concerns for a headache that started yesterday and has been consistent and nagging. Described as frontal HA rated 4/10. Reports floaters in her vision, denies scotoma. Reports that she has been hydrating well with water but admits to not being  very hungry or eating often. Reports increased anxiety about sleeping when baby sleeps, concerned that she will choke, established with Dr. Jenkins but needs follow up but reports taking wellbutrin as prescribed but endorses that sometimes she cuts her clonidine dose in half. She is a single mother at home with no help outside of her cousins, that sometimes visit her but they work during the day. She is not currently employed right now or receiving maternity leave compensation. She denies chest pain, shortness of breath, or right upper quadrant pain. Agreeable to meet with social work and consider a postpartum .      Pregnancy notable for:  Pregnancy Problems (from 06/13/24 to present)       Problem Noted Diagnosed Resolved    Encounter for induction of labor 12/30/2024 by Shirley Hernadez MD  No    Priority:  Medium       [facility] 12/24/2024 by Ashlee Cyr MD  No    Priority:  Medium       Overview Signed 12/24/2024 12:00 PM by Ashlee Cyr MD     Diagnosed at 37 weeks based on AC 5%tile, AC 22%tile  Repeat BPP on 12/26 and then IOL on 12/30         Supervision of high risk pregnancy in third trimester ([facility]) 12/6/2024 by Ashlee Cyr MD  No    Priority:  Medium       37 weeks gestation of pregnancy ([facility]) 6/14/2024 by Ashlee Cyr MD  No    Priority:  Medium       Overview Addendum 12/24/2024 11:59 AM by Ashlee Cyr MD     [x] Dating: by 10 week ultrasound (LMP unknown)  [x] Initial BMI: 29  [x] Prenatal Labs: reviewed, Rh negative, Rubella immune, Hgb 13.6  [x] Pap: 6/2024 ASCUS/+HR HPV, colpo performed and no high grade abnormalities suspected  [x] Aneuploidy Screening: normal first trimester screen  [x] Baby ASA: not indicated, one moderate risk factor  [x] Anatomy US:  absent nasal bone, declines genetic counseling/testing  [x] 1hr GCT at 24-28wks: normal at 81  [x] Tdap (27-36wks): Given 10/11/24  [x] Flu Shot: Given  10/11/24  [x] RSV vaccine: Done  [x] Rhogam (if Rh neg): given 10/31  [x] Third trimester growth: serial growth for history of IUFD, EFW at 34 weeks 37%tile  [x] GBS at 36 wks: positive, discussed antibiotics  [x] Breastfeeding: Plans to breastfeed, has wearable pump  [x] PPBC: Discussed, declines  [x] 39 weeks discussion of IOL vs. Expectant management: scheduled for IOL on   [x] Mode of delivery: Anticipate            Depression affecting pregnancy in first trimester, antepartum ([facility]) 2024 by Ashlee Cyr MD  No    Priority:  Medium       Overview Addendum 10/11/2024  1:48 PM by Ashlee Cyr MD     - previously diagnosed with anxiety, depression, ADHD, PTSD  - on wellbutin 200mg BID and klonipin PRN  - following with Dr. Jenkins         Rh negative state in antepartum period ([facility]) 2024 by Ashlee Cyr MD  No    Priority:  Medium       Overview Addendum 2024  1:33 PM by Ashlee Cyr MD     Rhogam given 10/31/24                  Obstetrical History   OB History    Para Term  AB Living   3 2 1 1 1 1   SAB IAB Ectopic Multiple Live Births   0 1     1      # Outcome Date GA Lbr Tim/2nd Weight Sex Type Anes PTL Lv   3 Term 25 38w4d  2.75 kg F Vag-Spont EPI  LUANNE   2      M Vag-Spont   FD   1 IAB                Past Medical History  Past Medical History:   Diagnosis Date    Low back pain, unspecified 2020    Chronic low back pain    Migraine without status migrainosus, not intractable 2024    Reviewed safe medications in pregnancy      Pain in right hip 2020    Bilateral hip pain    Pleurodynia 2021    Rib pain    Segmental and somatic dysfunction of cervical region 2020    Segmental and somatic dysfunction of cervical region    Segmental and somatic dysfunction of head region 2020    Segmental and somatic dysfunction of head region    Segmental and somatic dysfunction of lower extremity  02/23/2020    Segmental and somatic dysfunction of lower extremity    Segmental and somatic dysfunction of lumbar region 02/23/2020    Segmental and somatic dysfunction of lumbar region    Segmental and somatic dysfunction of pelvic region 02/23/2020    Segmental and somatic dysfunction of pelvic region    Segmental and somatic dysfunction of sacral region 02/23/2020    Segmental and somatic dysfunction of sacral region    Segmental and somatic dysfunction of thoracic region 02/23/2020    Segmental and somatic dysfunction of thoracic region        Past Surgical History   No past surgical history on file.    Social History  Social History     Tobacco Use    Smoking status: Never    Smokeless tobacco: Never   Substance Use Topics    Alcohol use: Not Currently     Substance and Sexual Activity   Drug Use Not Currently    Types: Marijuana     Social History     Socioeconomic History    Marital status: Single     Spouse name: Not on file    Number of children: Not on file    Years of education: Not on file    Highest education level: Not on file   Occupational History    Not on file   Tobacco Use    Smoking status: Never    Smokeless tobacco: Never   Vaping Use    Vaping status: Never Used   Substance and Sexual Activity    Alcohol use: Not Currently    Drug use: Not Currently     Types: Marijuana    Sexual activity: Not on file   Other Topics Concern    Not on file   Social History Narrative    Not on file     Social Drivers of Health     Financial Resource Strain: Medium Risk (12/30/2024)    Overall Financial Resource Strain (CARDIA)     Difficulty of Paying Living Expenses: Somewhat hard   Food Insecurity: No Food Insecurity (12/30/2024)    Hunger Vital Sign     Worried About Running Out of Food in the Last Year: Never true     Ran Out of Food in the Last Year: Never true   Transportation Needs: No Transportation Needs (12/30/2024)    PRAPARE - Transportation     Lack of Transportation (Medical): No     Lack of  Transportation (Non-Medical): No   Physical Activity: Not on file   Stress: Not on file   Social Connections: Not on file   Intimate Partner Violence: Not At Risk (12/30/2024)    Humiliation, Afraid, Rape, and Kick questionnaire     Fear of Current or Ex-Partner: No     Emotionally Abused: No     Physically Abused: No     Sexually Abused: No        Allergies  Patient has no known allergies.     Medications  Medications Prior to Admission   Medication Sig Dispense Refill Last Dose/Taking    acetaminophen (Tylenol) 325 mg tablet Take 3 tablets (975 mg) by mouth every 6 hours. 360 tablet 0     buPROPion SR (Wellbutrin SR) 200 mg 12 hr tablet Take 1 tablet (200 mg) by mouth 2 times a day. 180 tablet 3     cetirizine (ZyrTEC) 10 mg tablet Take 1 tablet (10 mg) by mouth once daily. 90 tablet 0     clonazePAM (KlonoPIN) 1 mg tablet Take 1 tablet (1 mg) by mouth once daily as needed for anxiety. TAKE 1 TABLET BY MOUTH ONCE DAILY AS NEEDED FOR ANXIETY 30 tablet 2     fluticasone (Flonase) 50 mcg/actuation nasal spray Administer 1 spray into each nostril once daily. Shake gently. Before first use, prime pump. After use, clean tip and replace cap. (Patient not taking: Reported on 12/30/2024) 16 g 5     ibuprofen 600 mg tablet Take 1 tablet (600 mg) by mouth every 6 hours. 120 tablet 0     polyethylene glycol (Glycolax, Miralax) 17 gram packet Take 17 g by mouth once daily. 30 packet 0     prenatal vit,calc76-iron-folic (Prenatabs Rx) 29 mg iron- 1 mg tablet Take 1 tablet by mouth once daily.          Objective    Last Vitals  Temp Pulse Resp BP MAP O2 Sat   36.6 °C (97.9 °F) 90   121/78           Physical Exam  Constitutional: Well nourished, in no acute distress, alert, pleasant and cooperative  Head/Neck: Normocephalic, atraumatic, Neck: Supple, no lymphadenopathy.  Eyes: PERRLA, Sclera are white, conjunctiva is pink.  Cardiopulmonary: warm and well perfused, breathing comfortably on room air,S1, S2, RRR  Respiratory/Thorax:  Normal respiratory effort on RA, clear to auscultation bilaterally, no wheezes or crackles.  Abdomen:  non-tender, soft on palpation, no organomegaly/masses or hernias  Gynecologic: Uterus firm, midline  Back: Spine normal without deformity or tenderness, no CVA tenderness   Neurological: alert, oriented, normal speech, no focal findings or movement disorder noted.  Psychological: Appropriate mood and affect. Awake and alert; oriented to person, place, and time.   Extremities: Symmetric bilaterally no deformities, cyanosis, edema or varicosities, peripheral pulses Intact  Skin: warm, dry, no lesions           Lab Review  Admission on 01/07/2025, Discharged on 01/07/2025   Component Date Value Ref Range Status    Glucose 01/07/2025 70 (L)  74 - 99 mg/dL Final    Sodium 01/07/2025 137  136 - 145 mmol/L Final    Potassium 01/07/2025 4.3  3.5 - 5.3 mmol/L Final    Chloride 01/07/2025 104  98 - 107 mmol/L Final    Bicarbonate 01/07/2025 24  21 - 32 mmol/L Final    Anion Gap 01/07/2025 13  10 - 20 mmol/L Final    Urea Nitrogen 01/07/2025 8  6 - 23 mg/dL Final    Creatinine 01/07/2025 0.76  0.50 - 1.05 mg/dL Final    eGFR 01/07/2025 >90  >60 mL/min/1.73m*2 Final    Calculations of estimated GFR are performed using the 2021 CKD-EPI Study Refit equation without the race variable for the IDMS-Traceable creatinine methods.  https://jasn.asnjournals.org/content/early/2021/09/22/ASN.6960299448    Calcium 01/07/2025 9.2  8.6 - 10.6 mg/dL Final    Albumin 01/07/2025 4.0  3.4 - 5.0 g/dL Final    Alkaline Phosphatase 01/07/2025 116 (H)  33 - 110 U/L Final    Total Protein 01/07/2025 7.2  6.4 - 8.2 g/dL Final    AST 01/07/2025 22  9 - 39 U/L Final    Bilirubin, Total 01/07/2025 0.3  0.0 - 1.2 mg/dL Final    ALT 01/07/2025 17  7 - 45 U/L Final    Patients treated with Sulfasalazine may generate falsely decreased results for ALT.    WBC 01/07/2025 7.4  4.4 - 11.3 x10*3/uL Final    nRBC 01/07/2025 0.0  0.0 - 0.0 /100 WBCs Final    RBC  01/07/2025 4.38  4.00 - 5.20 x10*6/uL Final    Hemoglobin 01/07/2025 13.3  12.0 - 16.0 g/dL Final    Hematocrit 01/07/2025 39.6  36.0 - 46.0 % Final    MCV 01/07/2025 90  80 - 100 fL Final    MCH 01/07/2025 30.4  26.0 - 34.0 pg Final    MCHC 01/07/2025 33.6  32.0 - 36.0 g/dL Final    RDW 01/07/2025 12.4  11.5 - 14.5 % Final    Platelets 01/07/2025 278  150 - 450 x10*3/uL Final    Uric Acid 01/07/2025 4.6  2.3 - 6.7 mg/dL Final    Venipuncture immediately after or during the administration of Metamizole may lead to falsely low results. Testing should be performed immediately  prior to Metamizole dosing.    LDH 01/07/2025 165  84 - 246 U/L Final

## 2025-01-07 NOTE — TELEPHONE ENCOUNTER
Called patient. Patient stated she had a headache that was not relieved with tylenol or mortin and visual changes. Spoke with Dr. Berry about patient symptoms.  advised to go to tell the patient to go to labor and delivery.     Patient informed to go to labor and delivery

## 2025-01-13 ENCOUNTER — APPOINTMENT (OUTPATIENT)
Dept: OBSTETRICS AND GYNECOLOGY | Facility: CLINIC | Age: 30
End: 2025-01-13
Payer: MEDICARE

## 2025-01-13 VITALS
DIASTOLIC BLOOD PRESSURE: 76 MMHG | WEIGHT: 188 LBS | SYSTOLIC BLOOD PRESSURE: 112 MMHG | HEIGHT: 68 IN | BODY MASS INDEX: 28.49 KG/M2

## 2025-01-13 DIAGNOSIS — F41.9 ANXIETY AND DEPRESSION: Primary | ICD-10-CM

## 2025-01-13 DIAGNOSIS — F32.A ANXIETY AND DEPRESSION: Primary | ICD-10-CM

## 2025-01-13 PROCEDURE — 0503F POSTPARTUM CARE VISIT: CPT | Performed by: OBSTETRICS & GYNECOLOGY

## 2025-01-13 ASSESSMENT — PAIN SCALES - GENERAL: PAINLEVEL_OUTOF10: 0-NO PAIN

## 2025-01-13 NOTE — PROGRESS NOTES
"Assessment   IMPRESSIONS:    1. Anxiety and depression (Primary)  - close interval follow up today for anxiety and depression as well as recent triage visit for headaches  - mood doing well on current medication regimen, follows with psychiatry  - headaches have improved, BP has been normal    Follow up for routine 6 week PP visit or sooner DEEPA Cyr MD    Subjective   29 y.o.  presenting for postpartum follow-up     Pregnancy/delivery notable for: went to L&D triage for headaches recently. Improved after discharged.  Sleeping has improved  Breastfeeding going well overall but slight oversupply    PHYSICAL EXAM  Objective   /76   Ht 1.727 m (5' 8\")   Wt 85.3 kg (188 lb)   LMP  (LMP Unknown)   Breastfeeding Yes   BMI 28.59 kg/m²      General:   Alert and oriented, in no acute distress         "

## 2025-02-07 ENCOUNTER — APPOINTMENT (OUTPATIENT)
Dept: OBSTETRICS AND GYNECOLOGY | Facility: CLINIC | Age: 30
End: 2025-02-07
Payer: MEDICARE

## 2025-02-12 ENCOUNTER — APPOINTMENT (OUTPATIENT)
Dept: OBSTETRICS AND GYNECOLOGY | Facility: CLINIC | Age: 30
End: 2025-02-12
Payer: MEDICARE

## 2025-02-13 ENCOUNTER — APPOINTMENT (OUTPATIENT)
Dept: BEHAVIORAL HEALTH | Facility: CLINIC | Age: 30
End: 2025-02-13
Payer: MEDICARE

## 2025-02-14 ENCOUNTER — APPOINTMENT (OUTPATIENT)
Dept: OBSTETRICS AND GYNECOLOGY | Facility: CLINIC | Age: 30
End: 2025-02-14
Payer: MEDICARE

## 2025-02-14 VITALS
BODY MASS INDEX: 28.19 KG/M2 | WEIGHT: 186 LBS | DIASTOLIC BLOOD PRESSURE: 60 MMHG | SYSTOLIC BLOOD PRESSURE: 120 MMHG | HEIGHT: 68 IN

## 2025-02-14 DIAGNOSIS — F41.9 ANXIETY AND DEPRESSION: ICD-10-CM

## 2025-02-14 DIAGNOSIS — F32.A ANXIETY AND DEPRESSION: ICD-10-CM

## 2025-02-14 ASSESSMENT — EDINBURGH POSTNATAL DEPRESSION SCALE (EPDS)
I HAVE BEEN SO UNHAPPY THAT I HAVE HAD DIFFICULTY SLEEPING: NOT AT ALL
I HAVE BEEN ABLE TO LAUGH AND SEE THE FUNNY SIDE OF THINGS: AS MUCH AS I ALWAYS COULD
I HAVE BLAMED MYSELF UNNECESSARILY WHEN THINGS WENT WRONG: NO, NEVER
I HAVE BEEN SO UNHAPPY THAT I HAVE BEEN CRYING: NO, NEVER
THE THOUGHT OF HARMING MYSELF HAS OCCURRED TO ME: NEVER
I HAVE BEEN ANXIOUS OR WORRIED FOR NO GOOD REASON: YES, SOMETIMES
TOTAL SCORE: 2
I HAVE FELT SAD OR MISERABLE: NO, NOT AT ALL
I HAVE LOOKED FORWARD WITH ENJOYMENT TO THINGS: AS MUCH AS I EVER DID
THINGS HAVE BEEN GETTING ON TOP OF ME: NO, I HAVE BEEN COPING AS WELL AS EVER
I HAVE FELT SCARED OR PANICKY FOR NO GOOD REASON: NO, NOT AT ALL

## 2025-02-14 ASSESSMENT — PAIN SCALES - GENERAL: PAINLEVEL_OUTOF10: 0-NO PAIN

## 2025-02-14 NOTE — PROGRESS NOTES
"Assessment   IMPRESSIONS:    1. Encounter for postpartum visit (Primary)  - overall doing well, declines contraception    2. Anxiety and depression  - struggling with anxiety, has apt with psychiatry tomorrow, doing therap6    Follow up for preventative visit in 6 months to 1 year or sooner DEEPA Cyr MD    Subjective   29 y.o.  presenting for postpartum follow-up     Delivery Date: 25  GA at Delivery: 38w4d  Type of Delivery:     Pregnancy/delivery notable for:     Concerns: still having PP anxiety. Mostly centered on baby's health and well being    Pain: None  Lacerations: first degree, no repair, denies concerns, feels back to normal, declines exam  Lochia: none  Menses: had first period  Sexual Intimacy: not yet  Contraceptive Method: Declines  Feeding Method: breast feeding and pumping  Lactation Consult Needed?: No  Birth Trauma: No  Bonding with Baby: well   Mood:   see HPI    Objective   Vitals:    25 1154   BP: 120/60       PHYSICAL EXAM  Objective   /60   Ht 1.727 m (5' 8\")   Wt 84.4 kg (186 lb)   LMP 2025 (Exact Date)   Breastfeeding Yes   BMI 28.28 kg/m²      General:   Alert and oriented, in no acute distress         "

## 2025-02-15 ENCOUNTER — TELEMEDICINE (OUTPATIENT)
Dept: BEHAVIORAL HEALTH | Facility: CLINIC | Age: 30
End: 2025-02-15
Payer: MEDICARE

## 2025-02-15 DIAGNOSIS — F41.9 ANXIETY: ICD-10-CM

## 2025-02-15 DIAGNOSIS — F90.0 ATTENTION DEFICIT HYPERACTIVITY DISORDER (ADHD), PREDOMINANTLY INATTENTIVE TYPE: ICD-10-CM

## 2025-02-15 DIAGNOSIS — F32.A DEPRESSION, UNSPECIFIED DEPRESSION TYPE: ICD-10-CM

## 2025-02-15 DIAGNOSIS — F41.8 POSTPARTUM ANXIETY: ICD-10-CM

## 2025-02-15 RX ORDER — CLONAZEPAM 1 MG/1
1 TABLET ORAL 2 TIMES DAILY PRN
Qty: 60 TABLET | Refills: 2 | Status: SHIPPED | OUTPATIENT
Start: 2025-02-15 | End: 2025-05-16

## 2025-02-15 RX ORDER — PROPRANOLOL HYDROCHLORIDE 10 MG/1
10 TABLET ORAL 3 TIMES DAILY PRN
Qty: 90 TABLET | Refills: 1 | Status: SHIPPED | OUTPATIENT
Start: 2025-02-15 | End: 2025-04-16

## 2025-02-15 RX ORDER — BUPROPION HYDROCHLORIDE 300 MG/1
300 TABLET ORAL DAILY
Qty: 30 TABLET | Refills: 11 | Status: SHIPPED | OUTPATIENT
Start: 2025-02-15 | End: 2026-02-15

## 2025-02-15 RX ORDER — BUPROPION HYDROCHLORIDE 150 MG/1
150 TABLET ORAL DAILY
Qty: 30 TABLET | Refills: 11 | Status: SHIPPED | OUTPATIENT
Start: 2025-02-15 | End: 2026-02-15
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
## 2025-02-17 ENCOUNTER — TELEPHONE (OUTPATIENT)
Dept: BEHAVIORAL HEALTH | Facility: CLINIC | Age: 30
End: 2025-02-17
Payer: MEDICARE

## 2025-03-05 ENCOUNTER — TELEMEDICINE (OUTPATIENT)
Dept: BEHAVIORAL HEALTH | Facility: CLINIC | Age: 30
End: 2025-03-05
Payer: MEDICARE

## 2025-03-05 DIAGNOSIS — F90.0 ATTENTION DEFICIT HYPERACTIVITY DISORDER (ADHD), PREDOMINANTLY INATTENTIVE TYPE: ICD-10-CM

## 2025-03-05 DIAGNOSIS — F41.8 POSTPARTUM ANXIETY: ICD-10-CM

## 2025-03-05 DIAGNOSIS — F32.A DEPRESSION, UNSPECIFIED DEPRESSION TYPE: ICD-10-CM

## 2025-03-05 PROCEDURE — 99214 OFFICE O/P EST MOD 30 MIN: CPT | Performed by: STUDENT IN AN ORGANIZED HEALTH CARE EDUCATION/TRAINING PROGRAM
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
## 2025-04-02 ENCOUNTER — TELEMEDICINE (OUTPATIENT)
Dept: BEHAVIORAL HEALTH | Facility: CLINIC | Age: 30
End: 2025-04-02
Payer: MEDICARE

## 2025-04-02 DIAGNOSIS — F90.0 ATTENTION DEFICIT HYPERACTIVITY DISORDER (ADHD), PREDOMINANTLY INATTENTIVE TYPE: ICD-10-CM

## 2025-04-02 DIAGNOSIS — F32.A DEPRESSION, UNSPECIFIED DEPRESSION TYPE: ICD-10-CM

## 2025-04-02 DIAGNOSIS — F41.8 POSTPARTUM ANXIETY: ICD-10-CM

## 2025-04-02 PROCEDURE — 99214 OFFICE O/P EST MOD 30 MIN: CPT | Performed by: STUDENT IN AN ORGANIZED HEALTH CARE EDUCATION/TRAINING PROGRAM
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
## 2025-04-30 ENCOUNTER — TELEMEDICINE (OUTPATIENT)
Dept: BEHAVIORAL HEALTH | Facility: CLINIC | Age: 30
End: 2025-04-30
Payer: MEDICARE

## 2025-04-30 DIAGNOSIS — F90.0 ATTENTION DEFICIT HYPERACTIVITY DISORDER (ADHD), PREDOMINANTLY INATTENTIVE TYPE: ICD-10-CM

## 2025-04-30 DIAGNOSIS — F32.A DEPRESSION, UNSPECIFIED DEPRESSION TYPE: ICD-10-CM

## 2025-04-30 DIAGNOSIS — F41.8 POSTPARTUM ANXIETY: ICD-10-CM

## 2025-04-30 DIAGNOSIS — F41.9 ANXIETY: ICD-10-CM

## 2025-04-30 PROCEDURE — 99214 OFFICE O/P EST MOD 30 MIN: CPT | Performed by: STUDENT IN AN ORGANIZED HEALTH CARE EDUCATION/TRAINING PROGRAM

## 2025-04-30 RX ORDER — DEXTROAMPHETAMINE SACCHARATE, AMPHETAMINE ASPARTATE, DEXTROAMPHETAMINE SULFATE AND AMPHETAMINE SULFATE 1.25; 1.25; 1.25; 1.25 MG/1; MG/1; MG/1; MG/1
5 TABLET ORAL 3 TIMES DAILY
Qty: 90 TABLET | Refills: 0 | Status: SHIPPED | OUTPATIENT
Start: 2025-04-30 | End: 2025-05-01

## 2025-04-30 RX ORDER — PROPRANOLOL HYDROCHLORIDE 10 MG/1
10 TABLET ORAL 3 TIMES DAILY PRN
Qty: 90 TABLET | Refills: 1 | Status: SHIPPED | OUTPATIENT
Start: 2025-04-30 | End: 2025-06-29

## 2025-04-30 RX ORDER — CLONAZEPAM 1 MG/1
1 TABLET ORAL 2 TIMES DAILY PRN
Qty: 60 TABLET | Refills: 2 | Status: SHIPPED | OUTPATIENT
Start: 2025-05-15 | End: 2025-05-01
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
## 2025-05-01 RX ORDER — CLONAZEPAM 1 MG/1
1 TABLET ORAL 2 TIMES DAILY PRN
Qty: 60 TABLET | Refills: 2 | Status: SHIPPED | OUTPATIENT
Start: 2025-05-15 | End: 2025-08-13

## 2025-05-01 RX ORDER — DEXTROAMPHETAMINE SACCHARATE, AMPHETAMINE ASPARTATE, DEXTROAMPHETAMINE SULFATE AND AMPHETAMINE SULFATE 1.25; 1.25; 1.25; 1.25 MG/1; MG/1; MG/1; MG/1
5 TABLET ORAL 3 TIMES DAILY
Qty: 90 TABLET | Refills: 0 | Status: SHIPPED | OUTPATIENT
Start: 2025-05-01 | End: 2025-05-02 | Stop reason: SDUPTHER

## 2025-05-02 ENCOUNTER — TELEPHONE (OUTPATIENT)
Dept: BEHAVIORAL HEALTH | Facility: CLINIC | Age: 30
End: 2025-05-02
Payer: MEDICARE

## 2025-05-02 DIAGNOSIS — F90.0 ATTENTION DEFICIT HYPERACTIVITY DISORDER (ADHD), PREDOMINANTLY INATTENTIVE TYPE: ICD-10-CM

## 2025-05-02 RX ORDER — DEXTROAMPHETAMINE SACCHARATE, AMPHETAMINE ASPARTATE, DEXTROAMPHETAMINE SULFATE AND AMPHETAMINE SULFATE 1.25; 1.25; 1.25; 1.25 MG/1; MG/1; MG/1; MG/1
5 TABLET ORAL
Qty: 30 TABLET | Refills: 0 | Status: SHIPPED | OUTPATIENT
Start: 2025-05-02 | End: 2025-06-01

## 2025-05-02 RX ORDER — DEXTROAMPHETAMINE SACCHARATE, AMPHETAMINE ASPARTATE, DEXTROAMPHETAMINE SULFATE AND AMPHETAMINE SULFATE 2.5; 2.5; 2.5; 2.5 MG/1; MG/1; MG/1; MG/1
10 TABLET ORAL
Qty: 30 TABLET | Refills: 0 | Status: SHIPPED | OUTPATIENT
Start: 2025-05-02 | End: 2025-06-01

## 2025-06-04 ENCOUNTER — TELEMEDICINE (OUTPATIENT)
Dept: BEHAVIORAL HEALTH | Facility: CLINIC | Age: 30
End: 2025-06-04
Payer: MEDICARE

## 2025-06-04 DIAGNOSIS — F90.0 ATTENTION DEFICIT HYPERACTIVITY DISORDER (ADHD), PREDOMINANTLY INATTENTIVE TYPE: ICD-10-CM

## 2025-06-04 DIAGNOSIS — F32.A DEPRESSION, UNSPECIFIED DEPRESSION TYPE: ICD-10-CM

## 2025-06-04 DIAGNOSIS — F41.8 POSTPARTUM ANXIETY: ICD-10-CM

## 2025-06-04 PROCEDURE — 99214 OFFICE O/P EST MOD 30 MIN: CPT | Performed by: STUDENT IN AN ORGANIZED HEALTH CARE EDUCATION/TRAINING PROGRAM

## 2025-06-04 RX ORDER — DEXTROAMPHETAMINE SACCHARATE, AMPHETAMINE ASPARTATE, DEXTROAMPHETAMINE SULFATE AND AMPHETAMINE SULFATE 3.75; 3.75; 3.75; 3.75 MG/1; MG/1; MG/1; MG/1
15 TABLET ORAL
Qty: 60 TABLET | Refills: 0 | Status: SHIPPED | OUTPATIENT
Start: 2025-06-04 | End: 2025-07-04
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
## 2025-07-02 DIAGNOSIS — F90.0 ATTENTION DEFICIT HYPERACTIVITY DISORDER (ADHD), PREDOMINANTLY INATTENTIVE TYPE: ICD-10-CM

## 2025-07-02 RX ORDER — DEXTROAMPHETAMINE SACCHARATE, AMPHETAMINE ASPARTATE, DEXTROAMPHETAMINE SULFATE AND AMPHETAMINE SULFATE 3.75; 3.75; 3.75; 3.75 MG/1; MG/1; MG/1; MG/1
15 TABLET ORAL
Qty: 60 TABLET | Refills: 0 | Status: SHIPPED | OUTPATIENT
Start: 2025-07-02 | End: 2025-08-01

## 2025-07-30 ENCOUNTER — TELEPHONE (OUTPATIENT)
Dept: BEHAVIORAL HEALTH | Facility: CLINIC | Age: 30
End: 2025-07-30
Payer: MEDICAID

## 2025-07-31 DIAGNOSIS — F90.0 ATTENTION DEFICIT HYPERACTIVITY DISORDER (ADHD), PREDOMINANTLY INATTENTIVE TYPE: ICD-10-CM

## 2025-07-31 RX ORDER — DEXTROAMPHETAMINE SACCHARATE, AMPHETAMINE ASPARTATE, DEXTROAMPHETAMINE SULFATE AND AMPHETAMINE SULFATE 3.75; 3.75; 3.75; 3.75 MG/1; MG/1; MG/1; MG/1
15 TABLET ORAL 3 TIMES DAILY
Qty: 90 TABLET | Refills: 0 | Status: SHIPPED | OUTPATIENT
Start: 2025-07-31 | End: 2025-07-31 | Stop reason: SDUPTHER

## 2025-07-31 RX ORDER — DEXTROAMPHETAMINE SACCHARATE, AMPHETAMINE ASPARTATE, DEXTROAMPHETAMINE SULFATE AND AMPHETAMINE SULFATE 3.75; 3.75; 3.75; 3.75 MG/1; MG/1; MG/1; MG/1
TABLET ORAL
Qty: 90 TABLET | Refills: 0 | Status: SHIPPED | OUTPATIENT
Start: 2025-07-31 | End: 2025-08-01

## 2025-08-01 ENCOUNTER — TELEPHONE (OUTPATIENT)
Dept: BEHAVIORAL HEALTH | Facility: CLINIC | Age: 30
End: 2025-08-01

## 2025-08-01 ENCOUNTER — APPOINTMENT (OUTPATIENT)
Dept: OBSTETRICS AND GYNECOLOGY | Facility: CLINIC | Age: 30
End: 2025-08-01
Payer: MEDICARE

## 2025-08-01 DIAGNOSIS — F90.0 ATTENTION DEFICIT HYPERACTIVITY DISORDER (ADHD), PREDOMINANTLY INATTENTIVE TYPE: ICD-10-CM

## 2025-08-01 RX ORDER — DEXTROAMPHETAMINE SACCHARATE, AMPHETAMINE ASPARTATE, DEXTROAMPHETAMINE SULFATE AND AMPHETAMINE SULFATE 7.5; 7.5; 7.5; 7.5 MG/1; MG/1; MG/1; MG/1
TABLET ORAL
Qty: 45 TABLET | Refills: 0 | Status: SHIPPED | OUTPATIENT
Start: 2025-08-01 | End: 2025-08-31

## 2025-08-01 RX ORDER — DEXTROAMPHETAMINE SACCHARATE, AMPHETAMINE ASPARTATE, DEXTROAMPHETAMINE SULFATE AND AMPHETAMINE SULFATE 7.5; 7.5; 7.5; 7.5 MG/1; MG/1; MG/1; MG/1
TABLET ORAL
Qty: 45 TABLET | Refills: 0 | Status: SHIPPED | OUTPATIENT
Start: 2025-08-01 | End: 2025-08-01 | Stop reason: SDUPTHER

## 2025-08-14 ENCOUNTER — APPOINTMENT (OUTPATIENT)
Dept: OBSTETRICS AND GYNECOLOGY | Facility: CLINIC | Age: 30
End: 2025-08-14
Payer: MEDICARE

## 2025-08-23 DIAGNOSIS — F41.9 ANXIETY: ICD-10-CM

## 2025-08-26 DIAGNOSIS — F41.9 ANXIETY: ICD-10-CM

## 2025-08-26 RX ORDER — CLONAZEPAM 1 MG/1
1 TABLET ORAL 2 TIMES DAILY PRN
Qty: 60 TABLET | Refills: 2 | Status: SHIPPED | OUTPATIENT
Start: 2025-08-26 | End: 2025-11-24

## 2025-08-27 ENCOUNTER — OFFICE VISIT (OUTPATIENT)
Dept: BEHAVIORAL HEALTH | Facility: CLINIC | Age: 30
End: 2025-08-27
Payer: MEDICAID

## 2025-08-27 DIAGNOSIS — F90.0 ATTENTION DEFICIT HYPERACTIVITY DISORDER (ADHD), PREDOMINANTLY INATTENTIVE TYPE: ICD-10-CM

## 2025-08-27 DIAGNOSIS — F41.9 ANXIETY: ICD-10-CM

## 2025-08-27 DIAGNOSIS — F41.8 POSTPARTUM ANXIETY: ICD-10-CM

## 2025-08-27 PROCEDURE — 99214 OFFICE O/P EST MOD 30 MIN: CPT | Performed by: STUDENT IN AN ORGANIZED HEALTH CARE EDUCATION/TRAINING PROGRAM

## 2025-08-27 PROCEDURE — 99214 OFFICE O/P EST MOD 30 MIN: CPT | Mod: GT,U1,AM | Performed by: STUDENT IN AN ORGANIZED HEALTH CARE EDUCATION/TRAINING PROGRAM

## 2025-08-27 RX ORDER — DEXTROAMPHETAMINE SACCHARATE, AMPHETAMINE ASPARTATE, DEXTROAMPHETAMINE SULFATE AND AMPHETAMINE SULFATE 2.5; 2.5; 2.5; 2.5 MG/1; MG/1; MG/1; MG/1
10 TABLET ORAL 2 TIMES DAILY
Qty: 60 TABLET | Refills: 0 | Status: SHIPPED | OUTPATIENT
Start: 2025-10-30 | End: 2025-11-29

## 2025-08-27 RX ORDER — PROPRANOLOL HYDROCHLORIDE 10 MG/1
10 TABLET ORAL 3 TIMES DAILY PRN
Qty: 90 TABLET | Refills: 1 | Status: SHIPPED | OUTPATIENT
Start: 2025-08-27 | End: 2025-10-26

## 2025-08-27 RX ORDER — DEXTROAMPHETAMINE SACCHARATE, AMPHETAMINE ASPARTATE, DEXTROAMPHETAMINE SULFATE AND AMPHETAMINE SULFATE 2.5; 2.5; 2.5; 2.5 MG/1; MG/1; MG/1; MG/1
10 TABLET ORAL 2 TIMES DAILY
Qty: 60 TABLET | Refills: 0 | Status: SHIPPED | OUTPATIENT
Start: 2025-08-31 | End: 2025-09-30

## 2025-08-27 RX ORDER — DEXTROAMPHETAMINE SACCHARATE, AMPHETAMINE ASPARTATE, DEXTROAMPHETAMINE SULFATE AND AMPHETAMINE SULFATE 2.5; 2.5; 2.5; 2.5 MG/1; MG/1; MG/1; MG/1
10 TABLET ORAL 2 TIMES DAILY
Qty: 60 TABLET | Refills: 0 | Status: SHIPPED | OUTPATIENT
Start: 2025-09-30 | End: 2025-10-30

## 2025-08-27 RX ORDER — CLONAZEPAM 1 MG/1
1 TABLET ORAL 2 TIMES DAILY PRN
Qty: 60 TABLET | Refills: 0 | OUTPATIENT
Start: 2025-08-27

## 2025-08-27 RX ORDER — DEXTROAMPHETAMINE SACCHARATE, AMPHETAMINE ASPARTATE MONOHYDRATE, DEXTROAMPHETAMINE SULFATE AND AMPHETAMINE SULFATE 7.5; 7.5; 7.5; 7.5 MG/1; MG/1; MG/1; MG/1
30 CAPSULE, EXTENDED RELEASE ORAL EVERY MORNING
Qty: 30 CAPSULE | Refills: 0 | Status: SHIPPED | OUTPATIENT
Start: 2025-09-30 | End: 2025-10-30

## 2025-08-27 RX ORDER — DEXTROAMPHETAMINE SACCHARATE, AMPHETAMINE ASPARTATE MONOHYDRATE, DEXTROAMPHETAMINE SULFATE AND AMPHETAMINE SULFATE 7.5; 7.5; 7.5; 7.5 MG/1; MG/1; MG/1; MG/1
30 CAPSULE, EXTENDED RELEASE ORAL EVERY MORNING
Qty: 30 CAPSULE | Refills: 0 | Status: SHIPPED | OUTPATIENT
Start: 2025-08-31 | End: 2025-09-30

## 2025-08-27 RX ORDER — DEXTROAMPHETAMINE SACCHARATE, AMPHETAMINE ASPARTATE MONOHYDRATE, DEXTROAMPHETAMINE SULFATE AND AMPHETAMINE SULFATE 7.5; 7.5; 7.5; 7.5 MG/1; MG/1; MG/1; MG/1
30 CAPSULE, EXTENDED RELEASE ORAL EVERY MORNING
Qty: 30 CAPSULE | Refills: 0 | Status: SHIPPED | OUTPATIENT
Start: 2025-10-30 | End: 2025-11-29